# Patient Record
Sex: MALE | Race: WHITE | NOT HISPANIC OR LATINO | URBAN - METROPOLITAN AREA
[De-identification: names, ages, dates, MRNs, and addresses within clinical notes are randomized per-mention and may not be internally consistent; named-entity substitution may affect disease eponyms.]

---
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
## 2023-04-21 ENCOUNTER — OFFICE VISIT (OUTPATIENT)
Dept: URGENT CARE | Facility: CLINIC | Age: 18
End: 2023-04-21
Payer: COMMERCIAL

## 2023-04-21 VITALS
SYSTOLIC BLOOD PRESSURE: 108 MMHG | BODY MASS INDEX: 19.7 KG/M2 | OXYGEN SATURATION: 98 % | HEART RATE: 92 BPM | DIASTOLIC BLOOD PRESSURE: 69 MMHG | TEMPERATURE: 98 F | RESPIRATION RATE: 18 BRPM | WEIGHT: 130 LBS | HEIGHT: 68 IN

## 2023-04-21 DIAGNOSIS — F45.8 BRUXISM: ICD-10-CM

## 2023-04-21 DIAGNOSIS — R19.7 DIARRHEA, UNSPECIFIED TYPE: ICD-10-CM

## 2023-04-21 DIAGNOSIS — K59.09 OTHER CONSTIPATION: ICD-10-CM

## 2023-04-21 DIAGNOSIS — R10.9 ABDOMINAL CRAMPING: ICD-10-CM

## 2023-04-21 DIAGNOSIS — R11.0 NAUSEA: Primary | ICD-10-CM

## 2023-04-21 PROCEDURE — 99214 OFFICE O/P EST MOD 30 MIN: CPT | Mod: S$GLB,,, | Performed by: NURSE PRACTITIONER

## 2023-04-21 PROCEDURE — 99214 PR OFFICE/OUTPT VISIT, EST, LEVL IV, 30-39 MIN: ICD-10-PCS | Mod: S$GLB,,, | Performed by: NURSE PRACTITIONER

## 2023-04-21 RX ORDER — ONDANSETRON 4 MG/1
4 TABLET, ORALLY DISINTEGRATING ORAL EVERY 6 HOURS PRN
Qty: 6 TABLET | Refills: 0 | Status: SHIPPED | OUTPATIENT
Start: 2023-04-21

## 2023-04-21 NOTE — PROGRESS NOTES
"Subjective:      Patient ID: Kevin Sawant is a 17 y.o. male.    Vitals:  height is 5' 7.5" (1.715 m) and weight is 59 kg (130 lb). His tympanic temperature is 97.7 °F (36.5 °C). His blood pressure is 108/69 and his pulse is 92. His respiration is 18 and oxygen saturation is 98%.     Chief Complaint: GI Problem    GI Problem  The primary symptoms include fatigue, abdominal pain and diarrhea. Primary symptoms do not include fever, nausea, vomiting, hematemesis or rash. The illness began yesterday.   The abdominal pain began yesterday. The abdominal pain is generalized. The abdominal pain does not radiate. The severity of the abdominal pain is 7/10. The abdominal pain is relieved by bowel movements and passing flatus.   The illness is also significant for bloating. The illness does not include anorexia, constipation or itching.     Constitution: Positive for fatigue. Negative for fever.   Gastrointestinal:  Positive for abdominal pain and diarrhea. Negative for nausea, vomiting and constipation.   Skin:  Negative for rash.      CHIEF COMPLAINT/REASON FOR VISIT:  Fatigue, abdominal pain, nausea, constipation with diarrhea    HISTORY OF PRESENT ILLNESS:  17-year-old male complains of fatigue, abdominal cramping, constipation with diarrhea, nausea onset yesterday.  Complains of headache at intervals. Patient does admit to having constipation problems at intervals.  Discussed diet and exercise.   Patient denies chest pain, shortness of breath, congestion, fever, cough, dizziness, blurred vision, vomiting,  " aching all over", fatigue, loss of appetite & fever.      Past Medical History:   Diagnosis Date    ADHD (attention deficit hyperactivity disorder)           Social History     Socioeconomic History    Marital status: Single   Tobacco Use    Smoking status: Every Day     Types: Vaping with nicotine    Smokeless tobacco: Never   Substance and Sexual Activity    Alcohol use: Yes     Comment: socially    Drug use: Yes     " Types: Marijuana    Sexual activity: Yes     Partners: Female        History reviewed. No pertinent family history.    ROS:  GENERAL: No fever, chills, fatigability or weight loss.  SKIN: No rashes, itching or changes in color or texture of skin.  HEENT:  headaches, no recent head trauma.  Denies ear pain, discharge or vertigo. No loss of smell, no epistaxis or postnasal drip. No hoarseness or change in voice.   NODES: No masses or lesions. Denies swollen glands.  CHEST: Denies cyanosis, wheezing, cough and sputum production.  CARDIOVASCULAR: Denies chest pain, PND, orthopnea or reduced exercise tolerance.  ABDOMEN:  Nausea, constipation with diarrhea, abdominal cramping.  MUSCULOSKELETAL: No joint stiffness or swelling. Denies back pain.  NEUROLOGIC: No history of seizures, paralysis, alteration of gait or coordination.  PSYCHIATRIC: Denies mood swings, depression or suicidal thoughts.    PE:   APPEARANCE: Well nourished, well developed, in moderate distress.   V/S: Reviewed.  SKIN: Normal skin turgor, no lesions.  HEENT: turbinates pink, mucous membranes okay, buccal mucosa with teeth marks, tenderness on palpation of TMJ region, pink pharynx, TMs clear bilateral.  CHEST:  No respiratory symptoms.  CARDIOVASCULAR: Regular rate and rhythm   ABDOMEN:  tenderness on palpation all 4 quadrants, hyperactive bowel sounds.  MUSCULOSKELETAL: Motor: 5/5 strength major flexors/extensors.  NEUROLOGIC: No sensory deficits. Gait & Posture: Normal gait and fine motion. No cerebellar signs.  MENTAL STATUS: Patient alert, oriented x 3 & conversant.    PLAN:   Administer Toradol 30 mg IM now/deferred  Advise increase p.o. fluids-- water/juice & rest  Advise use OTC laxative with stool softener  Advise use probiotic at HS  Advise warm heat  Advise use of  or retainer  Meds:  Zofran/no refills  Advise clear liquid diet  Advise avoid dairy products  Tylenol or Ibuprofen for fever, headache and body aches.  Advise follow up  with PCP in 2 -3 days for recheck  Advise go to ER if nausea, vomiting, fever, increased pain, or fail to improve with treatment.  AVS provided and reviewed with patient including supportive care, follow up, and red flag symptoms.   Patient verbalizes understanding and agrees with treatment plan. Discharged from Urgent Care in stable condition.      DIAGNOSIS:  Bruxism  Nausea  Diarrhea vs constipation  Abdominal cramping

## 2023-04-21 NOTE — PATIENT INSTRUCTIONS
PLAN:   Administer Toradol 30 mg IM now/deferred  Advise increase p.o. fluids-- water/juice & rest  Advise use OTC laxative with stool softener  Advise use probiotic at HS  Advise warm heat  Advise use of  or retainer  Meds:  Zofran/no refills  Advise clear liquid diet  Advise avoid dairy products  Tylenol or Ibuprofen for fever, headache and body aches.  Advise follow up with PCP in 2 -3 days for recheck  Advise go to ER if nausea, vomiting, fever, increased pain, or fail to improve with treatment.  AVS provided and reviewed with patient including supportive care, follow up, and red flag symptoms.   Patient verbalizes understanding and agrees with treatment plan. Discharged from Urgent Care in stable condition.

## 2024-07-19 ENCOUNTER — TELEPHONE (OUTPATIENT)
Dept: GASTROENTEROLOGY | Facility: CLINIC | Age: 19
End: 2024-07-19
Payer: COMMERCIAL

## 2024-07-19 NOTE — TELEPHONE ENCOUNTER
----- Message from Meka Burdick sent at 7/19/2024  2:52 PM CDT -----  Regarding: Returning missed call  Contact: Pt @[phone]  Pt is returning a missed call from someone in the office and is asking for a return call back soon. Thanks.         Reason for call: returning missed call from Ms. Salinas

## 2024-07-19 NOTE — TELEPHONE ENCOUNTER
----- Message from Jabier Teresa sent at 7/19/2024  1:27 PM CDT -----  Type:  Sooner Apoointment Request    Caller is requesting a sooner appointment.  Caller declined first available appointment listed below.  Caller will not accept being placed on the waitlist and is requesting a message be sent to doctor.  Name of Caller:pt  When is the first available appointment?none  Symptoms:stomach issues, problems w/ gaining weight, no appetite, nausea, vomiting  Would the patient rather a call back or a response via [facility]? call  Best Call Back Number:[phone]  Additional Information: pts mom states she would like a call from office if possible to explain symptoms and try to get an appt asap. Thank you

## 2024-07-19 NOTE — TELEPHONE ENCOUNTER
I return call. The gentleman that answered stated that he never called this number. He said have a good one and disconnected.

## 2024-07-23 ENCOUNTER — TELEPHONE (OUTPATIENT)
Dept: GASTROENTEROLOGY | Facility: CLINIC | Age: 19
End: 2024-07-23
Payer: COMMERCIAL

## 2024-07-23 NOTE — TELEPHONE ENCOUNTER
----- Message from Lola Garcia sent at 7/23/2024 11:08 AM CDT -----  Regarding: Missed call  Contact: Oksana/mother [phone]  Patient's mother Oksana  is returning a missed called from Rita Andrew MA in the office. Please call back as soon as possible.

## 2024-08-07 ENCOUNTER — LAB VISIT (OUTPATIENT)
Dept: LAB | Facility: HOSPITAL | Age: 19
End: 2024-08-07
Payer: COMMERCIAL

## 2024-08-07 ENCOUNTER — OFFICE VISIT (OUTPATIENT)
Dept: GASTROENTEROLOGY | Facility: CLINIC | Age: 19
End: 2024-08-07
Payer: COMMERCIAL

## 2024-08-07 VITALS
BODY MASS INDEX: 20.04 KG/M2 | HEART RATE: 92 BPM | DIASTOLIC BLOOD PRESSURE: 70 MMHG | WEIGHT: 132.25 LBS | SYSTOLIC BLOOD PRESSURE: 116 MMHG | HEIGHT: 68 IN

## 2024-08-07 DIAGNOSIS — R11.2 NAUSEA AND VOMITING, UNSPECIFIED VOMITING TYPE: ICD-10-CM

## 2024-08-07 DIAGNOSIS — F12.188 CANNABIS HYPEREMESIS SYNDROME CONCURRENT WITH AND DUE TO CANNABIS ABUSE: Primary | ICD-10-CM

## 2024-08-07 LAB
BASOPHILS # BLD AUTO: 0.08 K/UL (ref 0–0.2)
BASOPHILS NFR BLD: 1.2 % (ref 0–1.9)
DIFFERENTIAL METHOD BLD: NORMAL
EOSINOPHIL # BLD AUTO: 0.3 K/UL (ref 0–0.5)
EOSINOPHIL NFR BLD: 4.6 % (ref 0–8)
ERYTHROCYTE [DISTWIDTH] IN BLOOD BY AUTOMATED COUNT: 12.6 % (ref 11.5–14.5)
FERRITIN SERPL-MCNC: 116 NG/ML (ref 20–300)
HCT VFR BLD AUTO: 49.4 % (ref 40–54)
HGB BLD-MCNC: 15.9 G/DL (ref 14–18)
IMM GRANULOCYTES # BLD AUTO: 0.01 K/UL (ref 0–0.04)
IMM GRANULOCYTES NFR BLD AUTO: 0.2 % (ref 0–0.5)
IRON SERPL-MCNC: 128 UG/DL (ref 45–160)
LYMPHOCYTES # BLD AUTO: 2.2 K/UL (ref 1–4.8)
LYMPHOCYTES NFR BLD: 34 % (ref 18–48)
MCH RBC QN AUTO: 29.8 PG (ref 27–31)
MCHC RBC AUTO-ENTMCNC: 32.2 G/DL (ref 32–36)
MCV RBC AUTO: 93 FL (ref 82–98)
MONOCYTES # BLD AUTO: 0.3 K/UL (ref 0.3–1)
MONOCYTES NFR BLD: 5.3 % (ref 4–15)
NEUTROPHILS # BLD AUTO: 3.5 K/UL (ref 1.8–7.7)
NEUTROPHILS NFR BLD: 54.7 % (ref 38–73)
NRBC BLD-RTO: 0 /100 WBC
PLATELET # BLD AUTO: 228 K/UL (ref 150–450)
PMV BLD AUTO: 10 FL (ref 9.2–12.9)
RBC # BLD AUTO: 5.34 M/UL (ref 4.6–6.2)
SATURATED IRON: 36 % (ref 20–50)
TOTAL IRON BINDING CAPACITY: 351 UG/DL (ref 250–450)
TRANSFERRIN SERPL-MCNC: 237 MG/DL (ref 200–375)
WBC # BLD AUTO: 6.47 K/UL (ref 3.9–12.7)

## 2024-08-07 PROCEDURE — 83540 ASSAY OF IRON: CPT | Performed by: STUDENT IN AN ORGANIZED HEALTH CARE EDUCATION/TRAINING PROGRAM

## 2024-08-07 PROCEDURE — 99999 PR PBB SHADOW E&M-EST. PATIENT-LVL III: CPT | Mod: PBBFAC,,, | Performed by: STUDENT IN AN ORGANIZED HEALTH CARE EDUCATION/TRAINING PROGRAM

## 2024-08-07 PROCEDURE — 82728 ASSAY OF FERRITIN: CPT | Performed by: STUDENT IN AN ORGANIZED HEALTH CARE EDUCATION/TRAINING PROGRAM

## 2024-08-07 PROCEDURE — 85025 COMPLETE CBC W/AUTO DIFF WBC: CPT | Performed by: STUDENT IN AN ORGANIZED HEALTH CARE EDUCATION/TRAINING PROGRAM

## 2024-08-07 PROCEDURE — 36415 COLL VENOUS BLD VENIPUNCTURE: CPT | Performed by: STUDENT IN AN ORGANIZED HEALTH CARE EDUCATION/TRAINING PROGRAM

## 2024-08-07 PROCEDURE — 86677 HELICOBACTER PYLORI ANTIBODY: CPT | Performed by: STUDENT IN AN ORGANIZED HEALTH CARE EDUCATION/TRAINING PROGRAM

## 2024-08-08 LAB — H PYLORI IGG SERPL QL IA: NEGATIVE

## 2024-09-05 ENCOUNTER — HOSPITAL ENCOUNTER (OUTPATIENT)
Dept: RADIOLOGY | Facility: HOSPITAL | Age: 19
Discharge: HOME | End: 2024-09-05
Attending: ORTHOPAEDIC SURGERY
Payer: COMMERCIAL

## 2024-09-05 ENCOUNTER — OFFICE VISIT (OUTPATIENT)
Dept: SPORTS MEDICINE | Facility: CLINIC | Age: 19
End: 2024-09-05
Payer: COMMERCIAL

## 2024-09-05 VITALS — HEIGHT: 67 IN | WEIGHT: 132.25 LBS | BODY MASS INDEX: 20.76 KG/M2

## 2024-09-05 DIAGNOSIS — S99.921A INJURY OF RIGHT FOOT, INITIAL ENCOUNTER: Primary | ICD-10-CM

## 2024-09-05 DIAGNOSIS — S93.601A SPRAIN OF RIGHT FOOT, INITIAL ENCOUNTER: ICD-10-CM

## 2024-09-05 DIAGNOSIS — M79.672 LEFT FOOT PAIN: ICD-10-CM

## 2024-09-05 PROCEDURE — 1159F MED LIST DOCD IN RCRD: CPT | Mod: CPTII,S$GLB,, | Performed by: ORTHOPAEDIC SURGERY

## 2024-09-05 PROCEDURE — 73630 X-RAY EXAM OF FOOT: CPT | Mod: TC,PN,RT

## 2024-09-05 PROCEDURE — 99204 OFFICE O/P NEW MOD 45 MIN: CPT | Mod: 25,S$GLB,, | Performed by: ORTHOPAEDIC SURGERY

## 2024-09-05 PROCEDURE — 3008F BODY MASS INDEX DOCD: CPT | Mod: CPTII,S$GLB,, | Performed by: ORTHOPAEDIC SURGERY

## 2024-09-05 PROCEDURE — 97760 ORTHOTIC MGMT&TRAING 1ST ENC: CPT | Mod: GP,S$GLB,, | Performed by: ORTHOPAEDIC SURGERY

## 2024-09-05 PROCEDURE — 99999 PR PBB SHADOW E&M-EST. PATIENT-LVL IV: CPT | Mod: PBBFAC,,, | Performed by: ORTHOPAEDIC SURGERY

## 2024-09-05 PROCEDURE — 73630 X-RAY EXAM OF FOOT: CPT | Mod: 26,RT,, | Performed by: RADIOLOGY

## 2024-09-06 ENCOUNTER — HOSPITAL ENCOUNTER (OUTPATIENT)
Dept: RADIOLOGY | Facility: HOSPITAL | Age: 19
Discharge: HOME | End: 2024-09-06
Attending: ORTHOPAEDIC SURGERY
Payer: COMMERCIAL

## 2024-09-06 DIAGNOSIS — S99.921A INJURY OF RIGHT FOOT, INITIAL ENCOUNTER: ICD-10-CM

## 2024-09-06 PROCEDURE — 73718 MRI LOWER EXTREMITY W/O DYE: CPT | Mod: TC,PN,RT

## 2024-09-06 PROCEDURE — 73718 MRI LOWER EXTREMITY W/O DYE: CPT | Mod: 26,RT,, | Performed by: RADIOLOGY

## 2024-09-07 NOTE — PATIENT INSTRUCTIONS
Assessment:  Kevin Sawant is a  19 y.o. male [facility] [place] [employer] with a chief complaint of Injury of the Right Foot    Right midfoot injury on 8/27  Concern for midfoot sprain     Encounter Diagnoses   Name Primary?    Injury of right foot, initial encounter Yes    Sprain of right foot, initial encounter         Plan:  Switch to short walking boot to wear for protected ambulation  10 minutes were spent sizing, fitting, and educating regarding durable medical equipment by Dr. Wilfred Wade and his assistant under his direction today.  CPT 10680.  MRI of Right foot  Left foot WB xrays to rule out lisfranc injury     Follow-up: AFTER IMAGING or sooner if there are any problems between now and then.    Leave Review:   Google: Leave Google Review  Healthgrades: Leave Healthgrades Review    After Hours Number: [phone]

## 2024-09-07 NOTE — PROGRESS NOTES
Patient ID: Kevin Sawant  YOB: 2005  MRN: [id number]    Chief Complaint: Injury of the Right Foot    Referred By: [facility]     History of Present Illness: Kevin Sawant is a  19 y.o. male [facility] [employer] with a chief complaint of Injury of the Right Foot    Kevin presents today for evaluation of his right foot. He reports an injury during soccer on 8/27/24. He reports kicking another persons foot very hard on the top of his foot. He was evalauted at the [facility]. He was given a cast shoe for treatment. He reports continued dorsal foot pain. He reports swelling across the top of his foot where he hit the other person. He reports pain with plantarflexion of his foot and flexion of his toes.      Injury      Past Medical History:   Past Medical History:   Diagnosis Date    ADHD (attention deficit hyperactivity disorder)      History reviewed. No pertinent surgical history.  Family History   Problem Relation Name Age of Onset    Colon cancer Neg Hx      Esophageal cancer Neg Hx       Social History     Socioeconomic History    Marital status: Single   Tobacco Use    Smoking status: Every Day    Smokeless tobacco: Never    Tobacco comments:     ZYN 3mg nicotine pouches    Substance and Sexual Activity    Alcohol use: Yes     Comment: socially    Drug use: Yes     Types: Marijuana    Sexual activity: Yes     Partners: Female   Social History Narrative    N/A PER THE PATIENT.     Medication List with Changes/Refills   Current Medications    ONDANSETRON (ZOFRAN-ODT) 4 MG TBDL    Take 1 tablet (4 mg total) by mouth every 6 (six) hours as needed (P.r.n.).    UNABLE TO FIND    AG1 POWER     Review of patient's allergies indicates:   Allergen Reactions    Milk containing products (dairy)      ROS    Physical Exam:   Body mass index is 20.72 kg/m².  There were no vitals filed for this visit.   GENERAL: Well appearing, appropriate for stated age, no  acute distress.  CARDIOVASCULAR: Pulses regular by peripheral palpation.  PULMONARY: Respirations are even and non-labored.  NEURO: Awake, alert, and oriented x 3.  PSYCH: Mood & affect are appropriate.  HEENT: Head is normocephalic and atraumatic.          Right Ankle/Foot Exam     Inspection   Bruising:  Foot - absent  Effusion:  Foot - present    Swelling   The patient is swollen on the metatarsals.    Tenderness   The patient is tender to palpation of the metatarsals.    Pain   The patient exhibits pain of the metatarsals.    Range of Motion   The patient has normal right ankle ROM.  Ankle Joint   Right ankle plantar flexion: pain with plantarflexion across midfoot.     Tests   Anterior drawer: negative  Varus tilt: negative    Other   Sensation: normal    Comments:  Intact EHL, FHL, gastrocsoleus, and tibialis anterior. Sensation intact to light touch in superficial peroneal, deep peroneal, tibial, sural, and saphenous nerve distributions. Foot warm and well perfused with capillary refill of less than 2 seconds and palpable pedal pulses.          Vascular Exam     Right Pulses  Dorsalis Pedis:      2+  Posterior Tibial:      2+          Imaging:    X-Ray Foot Complete 3 view Right  Narrative: EXAMINATION:  XR FOOT COMPLETE 3 VIEW RIGHT    CLINICAL HISTORY:  . Pain in left foot    TECHNIQUE:  AP, lateral, and oblique views of the foot were performed.    COMPARISON:  None    FINDINGS:  There is no evidence to suggest acute fracture or dislocation.  The joint spaces appear to be well maintained.  Impression: As above    Electronically signed by: Neil Ibrahim DO  Date:    09/05/2024  Time:    13:27      Relevant imaging results reviewed and interpreted by me, discussed with the patient and / or family today. Agree with above findings    Other Tests:         Patient Instructions   Assessment:  Kevin Sawant is a  19 y.o. male [facility] [place] [employer] with a chief complaint of Injury of the  Right Foot    Right midfoot injury on 8/27  Concern for midfoot sprain     Encounter Diagnoses   Name Primary?    Injury of right foot, initial encounter Yes    Sprain of right foot, initial encounter         Plan:  Switch to short walking boot to wear for protected ambulation  10 minutes were spent sizing, fitting, and educating regarding durable medical equipment by Dr. Wilfred Wade and his assistant under his direction today.  CPT 42118.  MRI of Right foot  Left foot WB xrays to rule out lisfranc injury     Follow-up: AFTER IMAGING or sooner if there are any problems between now and then.    Leave Review:   Google: Leave Google Review  Healthgrades: Leave Healthgrades Review    After Hours Number: [phone]       Provider Note/Medical Decision Making:       I discussed worrisome and red flag signs and symptoms with the patient. The patient expressed understanding and agreed to alert me immediately or to go to the emergency room if they experience any of these.   Treatment plan was developed with input from the patient/family, and they expressed understanding and agreement with the plan. All questions were answered today.          Wilfred Wade MD  Orthopaedic Surgery & Sports Medicine       Disclaimer: This note was prepared using a voice recognition system and is likely to have sound alike errors within the text.     I, Elly Wong, acted as a scribe for Wilfred Wade MD for the duration of this office visit.

## 2024-09-12 ENCOUNTER — OFFICE VISIT (OUTPATIENT)
Dept: SPORTS MEDICINE | Facility: CLINIC | Age: 19
End: 2024-09-12
Payer: COMMERCIAL

## 2024-09-12 ENCOUNTER — HOSPITAL ENCOUNTER (OUTPATIENT)
Dept: RADIOLOGY | Facility: HOSPITAL | Age: 19
Discharge: HOME | End: 2024-09-12
Attending: ORTHOPAEDIC SURGERY
Payer: COMMERCIAL

## 2024-09-12 DIAGNOSIS — T14.8XXA BONE BRUISE: Primary | ICD-10-CM

## 2024-09-12 DIAGNOSIS — S90.31XD CONTUSION OF RIGHT FOOT, SUBSEQUENT ENCOUNTER: ICD-10-CM

## 2024-09-12 DIAGNOSIS — M79.672 BILATERAL FOOT PAIN: ICD-10-CM

## 2024-09-12 DIAGNOSIS — M79.671 BILATERAL FOOT PAIN: ICD-10-CM

## 2024-09-12 PROCEDURE — 1159F MED LIST DOCD IN RCRD: CPT | Mod: CPTII,S$GLB,, | Performed by: ORTHOPAEDIC SURGERY

## 2024-09-12 PROCEDURE — 73620 X-RAY EXAM OF FOOT: CPT | Mod: 26,50,, | Performed by: RADIOLOGY

## 2024-09-12 PROCEDURE — 99999 PR PBB SHADOW E&M-EST. PATIENT-LVL III: CPT | Mod: PBBFAC,,, | Performed by: ORTHOPAEDIC SURGERY

## 2024-09-12 PROCEDURE — 99214 OFFICE O/P EST MOD 30 MIN: CPT | Mod: S$GLB,,, | Performed by: ORTHOPAEDIC SURGERY

## 2024-09-12 PROCEDURE — 73620 X-RAY EXAM OF FOOT: CPT | Mod: TC,50,PN

## 2024-09-12 NOTE — PROGRESS NOTES
Patient ID: Kevin Sawant  YOB: 2005  MRN: [id number]    Chief Complaint: Pain of the Right Foot      Referred By: [facility]    History of Present Illness: Kevin Sawant is a  19 y.o. male [facility] [place] with a chief complaint of Pain of the Right Foot    Kevin presents today for follow up of his right foot. He reports minor improvement of his right foot from the walking boot. He presents today to review a right foot MRI.     Recall from 9/5/24: Kevin presents today for evaluation of his right foot. He reports an injury during soccer on 8/27/24. He reports kicking another persons foot very hard on the top of his foot. He was evalauted at the [facility]. He was given a cast shoe for treatment. He reports continued dorsal foot pain. He reports swelling across the top of his foot where he hit the other person. He reports pain with plantarflexion of his foot and flexion of his toes.          HPI    Past Medical History:   Past Medical History:   Diagnosis Date    ADHD (attention deficit hyperactivity disorder)      History reviewed. No pertinent surgical history.  Family History   Problem Relation Name Age of Onset    Colon cancer Neg Hx      Esophageal cancer Neg Hx       Social History     Socioeconomic History    Marital status: Single   Tobacco Use    Smoking status: Every Day    Smokeless tobacco: Never    Tobacco comments:     ZYN 3mg nicotine pouches    Substance and Sexual Activity    Alcohol use: Yes     Comment: socially    Drug use: Yes     Types: Marijuana    Sexual activity: Yes     Partners: Female   Social History Narrative    N/A PER THE PATIENT.     Medication List with Changes/Refills   Current Medications    ONDANSETRON (ZOFRAN-ODT) 4 MG TBDL    Take 1 tablet (4 mg total) by mouth every 6 (six) hours as needed (P.r.n.).    UNABLE TO FIND    AG1 POWER     Review of patient's allergies indicates:   Allergen Reactions    Milk  containing products (dairy)      ROS    Physical Exam:   There is no height or weight on file to calculate BMI.  There were no vitals filed for this visit.   GENERAL: Well appearing, appropriate for stated age, no acute distress.  CARDIOVASCULAR: Pulses regular by peripheral palpation.  PULMONARY: Respirations are even and non-labored.  NEURO: Awake, alert, and oriented x 3.  PSYCH: Mood & affect are appropriate.  HEENT: Head is normocephalic and atraumatic.          Right Ankle/Foot Exam     Other   Sensation: normal    Comments:  Alignment neutral  Able to perform heel raise   Swelling around 3rd and 4th metatarsal bases and midfoot  Pain across dorsal midfoot   Intact EHL, FHL, gastrocsoleus, and tibialis anterior. Sensation intact to light touch in superficial peroneal, deep peroneal, tibial, sural, and saphenous nerve distributions. Foot warm and well perfused with capillary refill of less than 2 seconds and palpable pedal pulses.          Vascular Exam     Right Pulses  Dorsalis Pedis:      2+  Posterior Tibial:      2+          Imaging:    X-Ray Foot 2 View Bilateral  Narrative: EXAM:  XR FOOT 2 VIEW BILATERAL    INDICATIONS:  Bilateral foot pain    TECHNIQUE:  Frontal view of both feet with a lateral left (lateral right was on study one week ago)    COMPARISONS:  Right foot x-ray and MR one week ago    FINDINGS:  Comparison views of this study with the previous right foot examination is normal.  I see no fractures, alignment abnormalities, foreign bodies nor degenerative changes.  No joint effusions and no congenital abnormalities.  Impression:   Normal bilateral feet as described (contusions seen on MR are not visible on x-ray)    Finalized on: 9/12/2024 8:53 AM By:  Odell López MD  BRRG# [id number]      2024-09-12 08:[id number]    BRSTEFANIEG      Relevant imaging results reviewed and interpreted by me, discussed with the patient and / or family today.  He had MRI with patient which shows bone contusions.  Morales  ligament intact.    Other Tests:       Patient Instructions   Assessment:  Kevin Sawant is a  19 y.o. male [facility] [place] [employer] with a chief complaint of Pain of the Right Foot    Right midfoot injury on 8/27  Midfoot bone marrow contusion pattern, most pronounced involving the 3rd and fourth metatarsal bases     Encounter Diagnoses   Name Primary?    Bone bruise Yes    Contusion of right foot, subsequent encounter         Plan:  Continue walking boot until walking with no pain. Transition to hard sole shoe and then transition to regular shoe when no pain with walking with hard sole shoe.   Advised to let pain guide return to activity  Order PT at [place] with Manuel - 2-3x per week for 6-8 weeks    Follow-up: 3 weeks (Virtual visit)  or sooner if there are any problems between now and then.    Leave Review:   Google: Leave Google Review  Healthgrades: Leave Healthgrades Review    After Hours Number: [phone]     Provider Note/Medical Decision Making:  Discussed surgical and nonsurgical options.  Recommend nonsurgical treatment considering the Lisfranc ligament is intact and there is no definite fracture line.      I discussed worrisome and red flag signs and symptoms with the patient. The patient expressed understanding and agreed to alert me immediately or to go to the emergency room if they experience any of these.   Treatment plan was developed with input from the patient/family, and they expressed understanding and agreement with the plan. All questions were answered today.          Wilfred Wade MD  Orthopaedic Surgery & Sports Medicine       Disclaimer: This note was prepared using a voice recognition system and is likely to have sound alike errors within the text.     I, Elly Wong, acted as a scribe for Wilfred Wade MD for the duration of this office visit.

## 2024-09-12 NOTE — PATIENT INSTRUCTIONS
Assessment:  Kevin Sawant is a  19 y.o. male [facility] [place] [employer] with a chief complaint of Pain of the Right Foot    Right midfoot injury on 8/27  Midfoot bone marrow contusion pattern, most pronounced involving the 3rd and fourth metatarsal bases     Encounter Diagnoses   Name Primary?    Bone bruise Yes    Contusion of right foot, subsequent encounter         Plan:  Continue walking boot until walking with no pain. Transition to hard sole shoe and then transition to regular shoe when no pain with walking with hard sole shoe.   Advised to let pain guide return to activity  Order PT at [place] with Manuel - 2-3x per week for 6-8 weeks    Follow-up: 3 weeks (Virtual visit)  or sooner if there are any problems between now and then.    Leave Review:   Google: Leave Google Review  Healthgrades: Leave Healthgrades Review    After Hours Number: [phone]

## 2024-09-16 ENCOUNTER — CLINICAL SUPPORT (OUTPATIENT)
Facility: HOSPITAL | Age: 19
End: 2024-09-16
Payer: COMMERCIAL

## 2024-09-16 DIAGNOSIS — T14.8XXA BONE BRUISE: ICD-10-CM

## 2024-09-16 DIAGNOSIS — R29.898 DECREASED STRENGTH OF LOWER EXTREMITY: ICD-10-CM

## 2024-09-16 DIAGNOSIS — S90.31XD CONTUSION OF RIGHT FOOT, SUBSEQUENT ENCOUNTER: ICD-10-CM

## 2024-09-16 DIAGNOSIS — R52 PAIN AGGRAVATED BY ACTIVITIES OF DAILY LIVING: Primary | ICD-10-CM

## 2024-09-16 PROCEDURE — 97110 THERAPEUTIC EXERCISES: CPT | Mod: PN | Performed by: PHYSICAL THERAPIST

## 2024-09-16 PROCEDURE — 97161 PT EVAL LOW COMPLEX 20 MIN: CPT | Mod: PN | Performed by: PHYSICAL THERAPIST

## 2024-09-18 ENCOUNTER — CLINICAL SUPPORT (OUTPATIENT)
Facility: HOSPITAL | Age: 19
End: 2024-09-18
Payer: COMMERCIAL

## 2024-09-18 DIAGNOSIS — R29.898 DECREASED STRENGTH OF LOWER EXTREMITY: ICD-10-CM

## 2024-09-18 DIAGNOSIS — R52 PAIN AGGRAVATED BY ACTIVITIES OF DAILY LIVING: Primary | ICD-10-CM

## 2024-09-18 PROCEDURE — 97112 NEUROMUSCULAR REEDUCATION: CPT | Mod: PN | Performed by: PHYSICAL THERAPIST

## 2024-09-18 PROCEDURE — 97110 THERAPEUTIC EXERCISES: CPT | Mod: PN | Performed by: PHYSICAL THERAPIST

## 2024-09-18 PROCEDURE — 97140 MANUAL THERAPY 1/> REGIONS: CPT | Mod: PN | Performed by: PHYSICAL THERAPIST

## 2024-09-18 PROCEDURE — 97530 THERAPEUTIC ACTIVITIES: CPT | Mod: PN | Performed by: PHYSICAL THERAPIST

## 2024-09-18 NOTE — PROGRESS NOTES
"[facility]   Physical Therapy Treatment Note      Name: Kevin Sawant  Clinic Number: [id number]    Therapy Diagnosis:   Encounter Diagnoses   Name Primary?    Pain aggravated by activities of daily living Yes    Decreased strength of lower extremity      Physician: Wilfred Wade MD     Physician Orders: PT Eval and Treat   Medical Diagnosis from Referral: Bone bruise [T14.8XXA], Contusion of right foot, subsequent encounter [S90.31XD]   Evaluation Date: 9/16/2024  Authorization Period Expiration: 9/12/25  Plan of Care Expiration: 12/16/24  Progress Note Due: 12/12/24  Visit # / Visits authorized: 1/ 20     Time In: 1010  Time Out: 1110  Total Billable Time: 53 minutes Billing reflects 1:1 direct supervision    MD follow-up:    Precautions: Standard    FOTO:  Goal: 87%  -Intake: 63%  -Status: incomplete  -Discharge: incomplete    Subjective     Pt reports: 9/18- Feeling better today, he started walking with normal shoes.  He was compliant with home exercise program.  Response to previous treatment: Improving symptoms  Functional change: Gradually improving function    Pain: Minimal/10  Location: right foot     Objective      Objective Measures updated at progress report unless specified otherwise.    Problem List:  Impaired ankle/foot mobility  Impaired calf strength  Impaired tolerance to functional loads    Treatment     Kevin received the treatments listed below:      CPT Code Intervention Date/Notes  9/18   MT Manual Therapy Ankle distraction, ankle AP mobs  Ankle PA mobs   TE SL heel raise 3 x 15   NR SLS around the world 4 x 30s   TE Goblet squat 25# 3 x 12   TE Seated heel raise on knee extension machine 105# NP   TA Alter G 50% running 2 min on/off x 20 minutes   NR Lateral step down 6" 3 x 15     15 minutes of Therapeutic Exercise (TE) to develop strength, endurance, ROM, and flexibility. (21599)  08 minutes of Manual therapy (MT) to improve pain and ROM. (59551)  10 minutes of " Neuromuscular Re-Education (NMR)  to improve: Balance, Coordination, Kinesthetic, Sense, Proprioception, and Posture. (62799)  20 minutes of Therapeutic Activities (TA) to improve functional performance. (14681)  Unattended Electrical Stimulation (ES) for muscle performance and/or pain modulation. (34107)  Vasopneumatic Device Therapy () for management of swelling/edema. (41126)  BFR: Blood flow restriction applied during exercise  NP: Not Performed        Patient Education and Home Exercises         Education provided:   Physical therapy diagnosis, prognosis, and plan of care.     Written Home Exercises Provided: Patient instructed to cont prior HEP.  Exercises were reviewed and Kevin was able to demonstrate them prior to the end of the session.  Kevin demonstrated good  understanding of the education provided.     See EMR under Patient Instructions for exercises provided prior visit.    Assessment     9/18- The patient shows improved symptoms today. He tolerated introduction to more functional loads including gravity assisted running without significant issues.    Kevin Is progressing well towards his goals.   Pt prognosis is Excellent.     Pt will continue to benefit from skilled outpatient physical therapy to address the deficits listed in the problem list box on initial evaluation, provide pt/family education and to maximize pt's level of independence in the home and community environment.     Pt's spiritual, cultural and educational needs considered and pt agreeable to plan of care and goals.    Anticipated barriers to physical therapy: None     Goals:     Short Term Goals:  6 weeks Status  Date Met   PAIN: Pt will report worst pain of 2/10 in order to progress toward max functional ability and improve quality of life. [x] Progressing  [] Met  [] Not Met     FUNCTION: Patient will demonstrate improved function as indicated by a functional score improvement of at least 5 points on FOTO. [x] Progressing  []  Met  [] Not Met     MOBILITY: Patient will improve AROM to 50% of stated goals, listed in objective measures above, in order to progress towards independence with functional activities.  [x] Progressing  [] Met  [] Not Met     STRENGTH: Patient will improve strength to 50% of stated goals, listed in objective measures above, in order to progress towards independence with functional activities. [x] Progressing  [] Met  [] Not Met     POSTURE: Patient will correct postural deviations in sitting and standing, to decrease pain and promote long term stability.  [x] Progressing  [] Met  [] Not Met     GAIT: Patient will demonstrate improved gait mechanics including symmetrical gait pattern in order to improve functional mobility, improve quality of life, and decrease risk of further injury or fall.  [x] Progressing  [] Met  [] Not Met     HEP: Patient will demonstrate independence with HEP in order to progress toward functional independence. [x] Progressing  [] Met  [] Not Met        Long Term Goals:  12 weeks Status Date Met   PAIN: Pt will report worst pain of 0/10 in order to progress toward max functional ability and improve quality of life [x] Progressing  [] Met  [] Not Met     FUNCTION: Patient will demonstrate improved function as indicated by a FOTO functional score improvement as listed in header. [x] Progressing  [] Met  [] Not Met     MOBILITY: Patient will improve AROM to stated goals, listed in objective measures above, in order to return to maximal functional potential and improve quality of life.  [x] Progressing  [] Met  [] Not Met     STRENGTH: Patient will improve strength to stated goals, listed in objective measures above, in order to improve functional independence and quality of life.  [x] Progressing  [] Met  [] Not Met     GAIT: Patient will demonstrate normalized gait mechanics with minimal compensation in order to return to PLOF. [x] Progressing  [] Met  [] Not Met     Patient will return to  normal ADL's, IADL's, community involvement, recreational activities, and work-related activities with less than or equal to 0/10 pain and maximal function.  [x] Progressing  [] Met  [] Not Met          Plan     Plan of care Certification: 9/16/2024 to 12/16/24.      Continue to progress per protocol and per patient tolerance      Manuel Morgan, PT

## 2024-09-19 NOTE — PLAN OF CARE
[facility]   Physical Therapy Initial Evaluation      Name: Kevin Sawant  Clinic Number: [id number]    Therapy Diagnosis:   Encounter Diagnoses   Name Primary?    Bone bruise     Contusion of right foot, subsequent encounter     Pain aggravated by activities of daily living Yes    Decreased strength of lower extremity         Physician: Wilfred Wade MD    Physician Orders: PT Eval and Treat   Medical Diagnosis from Referral: Bone bruise [T14.8XXA], Contusion of right foot, subsequent encounter [S90.31XD]   Evaluation Date: 9/16/2024  Authorization Period Expiration: 9/12/25  Plan of Care Expiration: 12/16/24  Progress Note Due: 12/12/24  Visit # / Visits authorized: 1/ 1     FOTO:  Goal: 87%  -Intake: 63%  -Status: incomplete  -Discharge: incomplete    Precautions: Standard     Time In: 1010  Time Out: 1105  Total Appointment Time (timed & untimed codes): 55 minutes    Subjective     Date of onset: Around 2 weeks ago    History of current condition - Kevin reports: Took a blow to the top of his foot when trying to kick a ball during club soccer tryouts. He can walk around fine, he was in a boot for awhile but feels okay with shoe split. If he were to try to run he thinks he would have pain.    He had a bone contusion on the same foot years ago.    Falls:     Imaging: [] Xray [x] MRI [] CT: Performed on:   Impression:   Multifocal midfoot bone marrow contusion pattern, most pronounced involving the third and fourth metatarsal bases.  Negative for fracture.    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    Medications:   Kevin has a current medication list which includes the following prescription(s): ondansetron and UNABLE TO FIND.    Allergies:   Review of patient's allergies indicates:   Allergen Reactions    Milk containing products (dairy)         Objective      Observation:   POSTURE:    GAIT:   Mildly antalgic gait      FUNCTIONAL MOVEMENT PATTERNS  Movement Analysis Notes   Squat []Functional  [x]Dysfunctional:  []Painful  [x]Non-Painful    Decreased R anterior tibial translation         RANGE OF MOTION:     Ankle/Foot AROM/PROM Right Left Pain/Dysfunction with Movement Goal   Dorsiflexion (20º OKC, 4" CKC) 10 20  4" CKC   Plantarflexion (50º) 50 50 Pain    Inversion (35º) 40 40     Eversion (15º) 15 15            STRENGTH:   L/E MMT Right  (spine) Left Pain/Dysfunction with Movement Goal   Ankle DF 4/5 5/5  5/5 B   Ankle PF 4/5 5/5  5/5 B   Ankle Inversion 4/5 5/5  5/5 B   Ankle Eversion 4/5 5/5  5/5 B          Joint mobility:  Impaired midfoot mobility  Impaired TC mobility  =      SENSATION  [x] Intact to Light Touch   [] Impaired:      PALPATION: Structures: Increased tenderness to palpation of: right midfoot        Function:     Intake Outcome Measure for FOTO foot Survey    Therapist reviewed FOTO scores for Kevin Sawant on 9/16/2024.   FOTO documents entered into [employer] [facility] - see Media section.    Intake Score: 63%         Treatment     Total Treatment time (time-based codes) separate from Evaluation: (13) minutes     Kevin received the treatments listed below:      CPT Code Intervention Date/Notes  9/16    MT Manual Therapy      TE heel raise 3 x 15    TE SLS around the world 3 X 30S    TE Air squat 3 x 12     08 minutes of Therapeutic Exercise (TE) to develop strength, endurance, ROM, and flexibility. (76478)  05 minutes of " Manual therapy (MT) to improve pain and ROM. (59020)  00 minutes of Neuromuscular Re-Education (NMR)  to improve: Balance, Coordination, Kinesthetic, Sense, Proprioception, and Posture. (72728)  00 minutes of Therapeutic Activities (TA) to improve functional performance. (84834)  Unattended Electrical Stimulation (ES) for muscle performance and/or pain modulation. (19081)  Vasopneumatic Device Therapy () for management of swelling/edema. (19752)  BFR: Blood flow restriction applied during exercise  NP: Not Performed    Patient Education and Home Exercises     Education provided: (10) minutes  PURPOSE: Patient educated on the impairments noted above and the effects of physical therapy intervention to improve overall condition and QOL.   Physical therapy diagnosis, prognosis, and plan of care.   Activity Modifications:     Written Home Exercises Provided: yes.  Exercises were reviewed and Kevin was able to demonstrate them prior to the end of the session.  Kevin demonstrated good  understanding of the education provided. See EMR under Patient Instructions for exercises provided during therapy sessions.    Assessment     Kevin is a 19 y.o. male referred to outpatient Physical Therapy with a medical diagnosis of Bone bruise [T14.8XXA], Contusion of right foot, subsequent encounter [S90.31XD] . Clinical exam is consistent with referring diagnosis.     Problem List:  Impaired ankle/foot mobility  Impaired calf strength  Impaired tolerance to functional loads    Pt prognosis is Excellent  Pt will benefit from skilled outpatient Physical Therapy to address the deficits stated above and in the chart below, provide pt/family education, and to maximize pt's level of independence.     Plan of care discussed with patient: Yes  Pt's spiritual, cultural and educational needs considered and patient is agreeable to the plan of care and goals as stated below:     Anticipated Barriers for therapy: none    Medical Necessity is  demonstrated by the following   History  Co-morbidities and personal factors that may impact the plan of care [x] LOW: no personal factors / co-morbidities  [] MODERATE: 1-2 personal factors / co-morbidities  [] HIGH: 3+ personal factors / co-morbidities    Moderate / High Support Documentation: See patient medical history and objective assessment     Examination  Body Structures and Functions, activity limitations and participation restrictions that may impact the plan of care [x] LOW: addressing 1-2 elements  [] MODERATE: 3+ elements  [] HIGH: 4+ elements (please support below)    Moderate / High Support Documentation: See patient medical history and objective assessment     Clinical Presentation [x] LOW: stable  [] MODERATE: Evolving  [] HIGH: Unstable     Decision Making/ Complexity Score: low         Short Term Goals:  6 weeks Status  Date Met   PAIN: Pt will report worst pain of 2/10 in order to progress toward max functional ability and improve quality of life. [x] Progressing  [] Met  [] Not Met    FUNCTION: Patient will demonstrate improved function as indicated by a functional score improvement of at least 5 points on FOTO. [x] Progressing  [] Met  [] Not Met    MOBILITY: Patient will improve AROM to 50% of stated goals, listed in objective measures above, in order to progress towards independence with functional activities.  [x] Progressing  [] Met  [] Not Met    STRENGTH: Patient will improve strength to 50% of stated goals, listed in objective measures above, in order to progress towards independence with functional activities. [x] Progressing  [] Met  [] Not Met    POSTURE: Patient will correct postural deviations in sitting and standing, to decrease pain and promote long term stability.  [x] Progressing  [] Met  [] Not Met    GAIT: Patient will demonstrate improved gait mechanics including symmetrical gait pattern in order to improve functional mobility, improve quality of life, and decrease risk of  further injury or fall.  [x] Progressing  [] Met  [] Not Met    HEP: Patient will demonstrate independence with HEP in order to progress toward functional independence. [x] Progressing  [] Met  [] Not Met      Long Term Goals:  12 weeks Status Date Met   PAIN: Pt will report worst pain of 0/10 in order to progress toward max functional ability and improve quality of life [x] Progressing  [] Met  [] Not Met    FUNCTION: Patient will demonstrate improved function as indicated by a FOTO functional score improvement as listed in header. [x] Progressing  [] Met  [] Not Met    MOBILITY: Patient will improve AROM to stated goals, listed in objective measures above, in order to return to maximal functional potential and improve quality of life.  [x] Progressing  [] Met  [] Not Met    STRENGTH: Patient will improve strength to stated goals, listed in objective measures above, in order to improve functional independence and quality of life.  [x] Progressing  [] Met  [] Not Met    GAIT: Patient will demonstrate normalized gait mechanics with minimal compensation in order to return to PLOF. [x] Progressing  [] Met  [] Not Met    Patient will return to normal ADL's, IADL's, community involvement, recreational activities, and work-related activities with less than or equal to 0/10 pain and maximal function.  [x] Progressing  [] Met  [] Not Met      Plan   Plan of care Certification: 9/16/2024 to 12/16/24.    Outpatient Physical Therapy 2 times weekly for 12 weeks to include any combination of the following interventions: virtual visits, dry needling, modalities, electrical stimulation (IFC, Pre-Mod, Attended with Functional Dry Needling), Cervical/Lumbar Traction, Gait Training, Manual Therapy, Neuromuscular Re-ed, Patient Education, Self Care, Therapeutic Exercise, and Therapeutic Activites     Manuel Morgan, PT, DPT      I CERTIFY THE NEED FOR THESE SERVICES FURNISHED UNDER THIS PLAN OF TREATMENT AND WHILE UNDER MY CARE    Physician's comments:     Physician's Signature: ___________________________________________________

## 2024-09-25 ENCOUNTER — CLINICAL SUPPORT (OUTPATIENT)
Facility: HOSPITAL | Age: 19
End: 2024-09-25
Payer: COMMERCIAL

## 2024-09-25 DIAGNOSIS — R52 PAIN AGGRAVATED BY ACTIVITIES OF DAILY LIVING: Primary | ICD-10-CM

## 2024-09-25 DIAGNOSIS — R29.898 DECREASED STRENGTH OF LOWER EXTREMITY: ICD-10-CM

## 2024-09-25 PROCEDURE — 97530 THERAPEUTIC ACTIVITIES: CPT | Mod: PN | Performed by: PHYSICAL THERAPIST

## 2024-09-25 PROCEDURE — 97112 NEUROMUSCULAR REEDUCATION: CPT | Mod: PN | Performed by: PHYSICAL THERAPIST

## 2024-09-25 PROCEDURE — 97110 THERAPEUTIC EXERCISES: CPT | Mod: PN | Performed by: PHYSICAL THERAPIST

## 2024-09-25 PROCEDURE — 97140 MANUAL THERAPY 1/> REGIONS: CPT | Mod: PN | Performed by: PHYSICAL THERAPIST

## 2024-09-25 NOTE — PROGRESS NOTES
[facility]   Physical Therapy Treatment Note      Name: Kevin Sawant  Clinic Number: [id number]    Therapy Diagnosis:   Encounter Diagnoses   Name Primary?    Pain aggravated by activities of daily living Yes    Decreased strength of lower extremity      Physician: Wilfred Wade MD     Physician Orders: PT Eval and Treat   Medical Diagnosis from Referral: Bone bruise [T14.8XXA], Contusion of right foot, subsequent encounter [S90.31XD]   Evaluation Date: 9/16/2024  Authorization Period Expiration: 9/12/25  Plan of Care Expiration: 12/16/24  Progress Note Due: 12/12/24  Visit # / Visits authorized: 1/ 20     Time In: 9:32  Time Out: 10:35  Total Billable Time: 52 minutes Billing reflects 1:1 direct supervision    MD follow-up:    Precautions: Standard    FOTO:  Goal: 87%  -Intake: 63%  -Status: incomplete  -Discharge: incomplete    Subjective     Pt reports: 9/25- Feeling good, he has able to run a mile and a half the other day.  He was compliant with home exercise program.  Response to previous treatment: Improving symptoms  Functional change: Gradually improving function    Pain: Minimal/10  Location: right foot     Objective      Objective Measures updated at progress report unless specified otherwise.    Problem List:  Impaired ankle/foot mobility  Impaired calf strength  Impaired tolerance to functional loads    Treatment     Kevin received the treatments listed below:      CPT Code Intervention Date/Notes  9/25   MT Manual Therapy Ankle distraction, ankle AP mobs  Ankle PA mobs   TA Treadmill Jog .5 mile    TA Agility Agility ladder  Lateral shuffle  Carioca  A skips   NR BOSU balance flat side up 3 x 30s hold  Y balance 3 x 3 rounds   NR Lateral band walk on forefoot GTB 3 x 10 yds   TE SL heel raise 15# kb 3 x 15   TE Easy bar squat 105# 3 x 8     08 minutes of Manual therapy (MT) to improve pain and ROM. (95462)  10 minutes of Therapeutic Exercise (TE) to develop strength,  endurance, ROM, and flexibility. (42680)  10 minutes of Neuromuscular Re-Education (NMR)  to improve: Balance, Coordination, Kinesthetic, Sense, Proprioception, and Posture. (91723)  24 minutes of Therapeutic Activities (TA) to improve functional performance. (80489)  Unattended Electrical Stimulation (ES) for muscle performance and/or pain modulation. (75067)  Vasopneumatic Device Therapy () for management of swelling/edema. (80913)  BFR: Blood flow restriction applied during exercise  NP: Not Performed        Patient Education and Home Exercises         Education provided:   Physical therapy diagnosis, prognosis, and plan of care.     Written Home Exercises Provided: Patient instructed to cont prior HEP.  Exercises were reviewed and Kevin was able to demonstrate them prior to the end of the session.  Kevin demonstrated good  understanding of the education provided.     See EMR under Patient Instructions for exercises provided prior visit.    Assessment     9/25- the patient is having much less foot sensitivity overall. Hes able to run overground without significant symptoms. He did well with introduction of agility exercises and higher strength exercises today    Kevin Is progressing well towards his goals.   Pt prognosis is Excellent.     Pt will continue to benefit from skilled outpatient physical therapy to address the deficits listed in the problem list box on initial evaluation, provide pt/family education and to maximize pt's level of independence in the home and community environment.     Pt's spiritual, cultural and educational needs considered and pt agreeable to plan of care and goals.    Anticipated barriers to physical therapy: None     Goals:     Short Term Goals:  6 weeks Status  Date Met   PAIN: Pt will report worst pain of 2/10 in order to progress toward max functional ability and improve quality of life. [x] Progressing  [] Met  [] Not Met     FUNCTION: Patient will demonstrate improved function  as indicated by a functional score improvement of at least 5 points on FOTO. [x] Progressing  [] Met  [] Not Met     MOBILITY: Patient will improve AROM to 50% of stated goals, listed in objective measures above, in order to progress towards independence with functional activities.  [x] Progressing  [] Met  [] Not Met     STRENGTH: Patient will improve strength to 50% of stated goals, listed in objective measures above, in order to progress towards independence with functional activities. [x] Progressing  [] Met  [] Not Met     POSTURE: Patient will correct postural deviations in sitting and standing, to decrease pain and promote long term stability.  [x] Progressing  [] Met  [] Not Met     GAIT: Patient will demonstrate improved gait mechanics including symmetrical gait pattern in order to improve functional mobility, improve quality of life, and decrease risk of further injury or fall.  [x] Progressing  [] Met  [] Not Met     HEP: Patient will demonstrate independence with HEP in order to progress toward functional independence. [x] Progressing  [] Met  [] Not Met        Long Term Goals:  12 weeks Status Date Met   PAIN: Pt will report worst pain of 0/10 in order to progress toward max functional ability and improve quality of life [x] Progressing  [] Met  [] Not Met     FUNCTION: Patient will demonstrate improved function as indicated by a FOTO functional score improvement as listed in header. [x] Progressing  [] Met  [] Not Met     MOBILITY: Patient will improve AROM to stated goals, listed in objective measures above, in order to return to maximal functional potential and improve quality of life.  [x] Progressing  [] Met  [] Not Met     STRENGTH: Patient will improve strength to stated goals, listed in objective measures above, in order to improve functional independence and quality of life.  [x] Progressing  [] Met  [] Not Met     GAIT: Patient will demonstrate normalized gait mechanics with minimal  compensation in order to return to PLOF. [x] Progressing  [] Met  [] Not Met     Patient will return to normal ADL's, IADL's, community involvement, recreational activities, and work-related activities with less than or equal to 0/10 pain and maximal function.  [x] Progressing  [] Met  [] Not Met          Plan     Plan of care Certification: 9/16/2024 to 12/16/24.      Continue to progress per protocol and per patient tolerance      Manuel Morgan, PT

## 2024-09-30 ENCOUNTER — CLINICAL SUPPORT (OUTPATIENT)
Facility: HOSPITAL | Age: 19
End: 2024-09-30
Payer: COMMERCIAL

## 2024-09-30 DIAGNOSIS — R29.898 DECREASED STRENGTH OF LOWER EXTREMITY: ICD-10-CM

## 2024-09-30 DIAGNOSIS — R52 PAIN AGGRAVATED BY ACTIVITIES OF DAILY LIVING: Primary | ICD-10-CM

## 2024-09-30 PROCEDURE — 97110 THERAPEUTIC EXERCISES: CPT | Mod: PN | Performed by: PHYSICAL THERAPIST

## 2024-09-30 PROCEDURE — 97112 NEUROMUSCULAR REEDUCATION: CPT | Mod: PN | Performed by: PHYSICAL THERAPIST

## 2024-09-30 PROCEDURE — 97140 MANUAL THERAPY 1/> REGIONS: CPT | Mod: PN | Performed by: PHYSICAL THERAPIST

## 2024-09-30 NOTE — PROGRESS NOTES
[facility]   Physical Therapy Treatment Note      Name: Kevin Sawant  Clinic Number: [id number]    Therapy Diagnosis:   Encounter Diagnoses   Name Primary?    Pain aggravated by activities of daily living Yes    Decreased strength of lower extremity      Physician: Wilfred Wade MD     Physician Orders: PT Eval and Treat   Medical Diagnosis from Referral: Bone bruise [T14.8XXA], Contusion of right foot, subsequent encounter [S90.31XD]   Evaluation Date: 9/16/2024  Authorization Period Expiration: 9/12/25  Plan of Care Expiration: 12/16/24  Progress Note Due: 12/12/24  Visit # / Visits authorized: 3/20 (+1 for evaluation)     Precautions: Standard    Time In: 9:40 am  Time Out: 10:36 am  Total Billable Time: 56 minutes    MD follow-up:    Precautions: Standard    Subjective     Pt reports: No pain today but continued pain with prolonged activity.   He was compliant with home exercise program.  Response to previous treatment: Improving symptoms  Functional change: Gradually improving function    Pain: Minimal/10  Location: right foot     Objective      Objective Measures updated at progress report unless specified otherwise.    Problem List:  Impaired ankle/foot mobility  Impaired calf strength  Impaired tolerance to functional loads    Treatment     Kevin received the treatments listed below:      CPT Code Intervention Date/Notes  9/30   MT Manual Therapy Midfoot mobilizations  Metartarsal mobilizations    TE Treadmill Jog .5 mile   NR Agility Agility ladder  Lateral shuffle  Carioca  A skips   NR BOSU balance flat side up 3 x 30s hold   NR Lateral band walk on forefoot RTB 3 x 10 yds   TE SL heel raise 20# kb 4 x 15   TE Safety bar squat 105# 3 x 8   NR SLS around the [place]     TE Goblet squat     TE Seated heel raise on knee extension machine     TA Alter G     NR Lateral step down                                                10 minutes of Manual therapy (MT) to improve pain and ROM.  (78370)  20 minutes of Therapeutic Exercise (TE) to develop strength, endurance, ROM, and flexibility. (45620)  25 minutes of Neuromuscular Re-Education (NR)  to improve: Balance, Coordination, Kinesthetic, Sense, Proprioception, and Posture. (46326)        Patient Education and Home Exercises         Education provided:   Physical therapy diagnosis, prognosis, and plan of care.     Written Home Exercises Provided: Patient instructed to cont prior HEP.  Exercises were reviewed and Kevin was able to demonstrate them prior to the end of the session.  Kevin demonstrated good  understanding of the education provided.     See EMR under Patient Instructions for exercises provided prior visit.    Assessment   Performed MT to improve forefoot and midfoot mobility. Patient tolerated today's session well without complaint of pain.     Kevin Is progressing well towards his goals.   Pt prognosis is Excellent.     Pt will continue to benefit from skilled outpatient physical therapy to address the deficits listed in the problem list box on initial evaluation, provide pt/family education and to maximize pt's level of independence in the home and community environment.     Pt's spiritual, cultural and educational needs considered and pt agreeable to plan of care and goals.    Anticipated barriers to physical therapy: None     Goals:     Short Term Goals:  6 weeks Status  Date Met   PAIN: Pt will report worst pain of 2/10 in order to progress toward max functional ability and improve quality of life. [x] Progressing  [] Met  [] Not Met     FUNCTION: Patient will demonstrate improved function as indicated by a functional score improvement of at least 5 points on FOTO. [x] Progressing  [] Met  [] Not Met     MOBILITY: Patient will improve AROM to 50% of stated goals, listed in objective measures above, in order to progress towards independence with functional activities.  [x] Progressing  [] Met  [] Not Met     STRENGTH: Patient will  improve strength to 50% of stated goals, listed in objective measures above, in order to progress towards independence with functional activities. [x] Progressing  [] Met  [] Not Met     POSTURE: Patient will correct postural deviations in sitting and standing, to decrease pain and promote long term stability.  [x] Progressing  [] Met  [] Not Met     GAIT: Patient will demonstrate improved gait mechanics including symmetrical gait pattern in order to improve functional mobility, improve quality of life, and decrease risk of further injury or fall.  [x] Progressing  [] Met  [] Not Met     HEP: Patient will demonstrate independence with HEP in order to progress toward functional independence. [x] Progressing  [] Met  [] Not Met        Long Term Goals:  12 weeks Status Date Met   PAIN: Pt will report worst pain of 0/10 in order to progress toward max functional ability and improve quality of life [x] Progressing  [] Met  [] Not Met     FUNCTION: Patient will demonstrate improved function as indicated by a FOTO functional score improvement as listed in header. [x] Progressing  [] Met  [] Not Met     MOBILITY: Patient will improve AROM to stated goals, listed in objective measures above, in order to return to maximal functional potential and improve quality of life.  [x] Progressing  [] Met  [] Not Met     STRENGTH: Patient will improve strength to stated goals, listed in objective measures above, in order to improve functional independence and quality of life.  [x] Progressing  [] Met  [] Not Met     GAIT: Patient will demonstrate normalized gait mechanics with minimal compensation in order to return to PLOF. [x] Progressing  [] Met  [] Not Met     Patient will return to normal ADL's, IADL's, community involvement, recreational activities, and work-related activities with less than or equal to 0/10 pain and maximal function.  [x] Progressing  [] Met  [] Not Met          Plan   Continue to progress per protocol and per  patient tolerance      Frederick Simon, PT, DPT, SCS

## 2024-10-02 ENCOUNTER — CLINICAL SUPPORT (OUTPATIENT)
Facility: HOSPITAL | Age: 19
End: 2024-10-02
Payer: COMMERCIAL

## 2024-10-02 DIAGNOSIS — R29.898 DECREASED STRENGTH OF LOWER EXTREMITY: ICD-10-CM

## 2024-10-02 DIAGNOSIS — R52 PAIN AGGRAVATED BY ACTIVITIES OF DAILY LIVING: Primary | ICD-10-CM

## 2024-10-02 PROCEDURE — 97140 MANUAL THERAPY 1/> REGIONS: CPT | Mod: PN | Performed by: PHYSICAL THERAPIST

## 2024-10-02 PROCEDURE — 97110 THERAPEUTIC EXERCISES: CPT | Mod: PN | Performed by: PHYSICAL THERAPIST

## 2024-10-02 PROCEDURE — 97112 NEUROMUSCULAR REEDUCATION: CPT | Mod: PN | Performed by: PHYSICAL THERAPIST

## 2024-10-02 NOTE — PROGRESS NOTES
[facility]   Physical Therapy Treatment Note      Name: Kevin Sawant  Clinic Number: [id number]    Therapy Diagnosis:   Encounter Diagnoses   Name Primary?    Pain aggravated by activities of daily living Yes    Decreased strength of lower extremity      Physician: Wilfred Wade MD     Physician Orders: PT Eval and Treat   Medical Diagnosis from Referral: Bone bruise [T14.8XXA], Contusion of right foot, subsequent encounter [S90.31XD]   Evaluation Date: 9/16/2024  Authorization Period Expiration: 9/12/25  Plan of Care Expiration: 12/16/24  Progress Note Due: 12/12/24  Visit # / Visits authorized: 4/20 (+1 for evaluation)     Precautions: Standard    Time In: 9:50 AM  Time Out: 10:45 AM  Total Billable Time: 55 minutes    MD follow-up:    Precautions: Standard    Subjective     Pt reports: he is doing well today and is not noticing any foot pain. He has no new complaints.  He was compliant with home exercise program.  Response to previous treatment: Improving symptoms  Functional change: Gradually improving function    Pain: Minimal/10  Location: right foot     Objective      Objective Measures updated at progress report unless specified otherwise.    Problem List:  Impaired ankle/foot mobility  Impaired calf strength  Impaired tolerance to functional loads    Treatment     Kevin received the treatments listed below:      CPT Code Intervention Date/Notes  10/2/24   MT Manual Therapy Ankle distraction, ankle AP mobs  Ankle PA mobs   TE Treadmill Jog .5 mile   NR Agility Agility ladder  Lateral shuffle  Carioca  A skips   NR BOSU balance flat side up 3 x 30s hold   NR Lateral band walk on forefoot RTB 3 x 10 yds   TE SL heel raise 20# kb 4 x 15   TE Safety bar squat Bar 1 x 8   105# 1 x 8  125# 2 x 8      10 minutes of Manual therapy (MT) to improve pain and ROM. (52195)  20 minutes of Therapeutic Exercise (TE) to develop strength, endurance, ROM, and flexibility. (12380)  25 minutes of  Neuromuscular Re-Education (NR)  to improve: Balance, Coordination, Kinesthetic, Sense, Proprioception, and Posture. (26595)         Patient Education and Home Exercises         Education provided:   Physical therapy diagnosis, prognosis, and plan of care.     Written Home Exercises Provided: Patient instructed to cont prior HEP.  Exercises were reviewed and Kevin was able to demonstrate them prior to the end of the session.  Kevin demonstrated good  understanding of the education provided.     See EMR under Patient Instructions for exercises provided prior visit.    Assessment   Patient tolerated today's treatment well. He continues to improve. He will benefit from continued physical therapy care.    Kevin Is progressing well towards his goals.   Pt prognosis is Excellent.     Pt will continue to benefit from skilled outpatient physical therapy to address the deficits listed in the problem list box on initial evaluation, provide pt/family education and to maximize pt's level of independence in the home and community environment.     Pt's spiritual, cultural and educational needs considered and pt agreeable to plan of care and goals.    Anticipated barriers to physical therapy: None     Goals:     Short Term Goals:  6 weeks Status  Date Met   PAIN: Pt will report worst pain of 2/10 in order to progress toward max functional ability and improve quality of life. [x] Progressing  [] Met  [] Not Met     FUNCTION: Patient will demonstrate improved function as indicated by a functional score improvement of at least 5 points on FOTO. [x] Progressing  [] Met  [] Not Met     MOBILITY: Patient will improve AROM to 50% of stated goals, listed in objective measures above, in order to progress towards independence with functional activities.  [x] Progressing  [] Met  [] Not Met     STRENGTH: Patient will improve strength to 50% of stated goals, listed in objective measures above, in order to progress towards independence with  functional activities. [x] Progressing  [] Met  [] Not Met     POSTURE: Patient will correct postural deviations in sitting and standing, to decrease pain and promote long term stability.  [x] Progressing  [] Met  [] Not Met     GAIT: Patient will demonstrate improved gait mechanics including symmetrical gait pattern in order to improve functional mobility, improve quality of life, and decrease risk of further injury or fall.  [x] Progressing  [] Met  [] Not Met     HEP: Patient will demonstrate independence with HEP in order to progress toward functional independence. [x] Progressing  [] Met  [] Not Met        Long Term Goals:  12 weeks Status Date Met   PAIN: Pt will report worst pain of 0/10 in order to progress toward max functional ability and improve quality of life [x] Progressing  [] Met  [] Not Met     FUNCTION: Patient will demonstrate improved function as indicated by a FOTO functional score improvement as listed in header. [x] Progressing  [] Met  [] Not Met     MOBILITY: Patient will improve AROM to stated goals, listed in objective measures above, in order to return to maximal functional potential and improve quality of life.  [x] Progressing  [] Met  [] Not Met     STRENGTH: Patient will improve strength to stated goals, listed in objective measures above, in order to improve functional independence and quality of life.  [x] Progressing  [] Met  [] Not Met     GAIT: Patient will demonstrate normalized gait mechanics with minimal compensation in order to return to PLOF. [x] Progressing  [] Met  [] Not Met     Patient will return to normal ADL's, IADL's, community involvement, recreational activities, and work-related activities with less than or equal to 0/10 pain and maximal function.  [x] Progressing  [] Met  [] Not Met          Plan   Continue to progress per protocol and per patient tolerance      Alec Paul, PT, DPT, SCS

## 2024-11-05 ENCOUNTER — OFFICE VISIT (OUTPATIENT)
Dept: URGENT CARE | Facility: CLINIC | Age: 19
End: 2024-11-05
Payer: COMMERCIAL

## 2024-11-05 VITALS
OXYGEN SATURATION: 99 % | HEIGHT: 68 IN | WEIGHT: 132.63 LBS | DIASTOLIC BLOOD PRESSURE: 61 MMHG | TEMPERATURE: 98 F | HEART RATE: 83 BPM | RESPIRATION RATE: 18 BRPM | SYSTOLIC BLOOD PRESSURE: 110 MMHG | BODY MASS INDEX: 20.1 KG/M2

## 2024-11-05 DIAGNOSIS — R50.9 FEVER, UNSPECIFIED FEVER CAUSE: ICD-10-CM

## 2024-11-05 DIAGNOSIS — R51.9 NONINTRACTABLE HEADACHE, UNSPECIFIED CHRONICITY PATTERN, UNSPECIFIED HEADACHE TYPE: Primary | ICD-10-CM

## 2024-11-05 DIAGNOSIS — Z20.828 EXPOSURE TO THE FLU: ICD-10-CM

## 2024-11-05 DIAGNOSIS — F17.200 SMOKER: ICD-10-CM

## 2024-11-05 DIAGNOSIS — R52 BODY ACHES: ICD-10-CM

## 2024-11-05 DIAGNOSIS — J06.9 UPPER RESPIRATORY TRACT INFECTION, UNSPECIFIED TYPE: ICD-10-CM

## 2024-11-05 LAB
CTP QC/QA: YES
POC MOLECULAR INFLUENZA A AGN: NEGATIVE
POC MOLECULAR INFLUENZA B AGN: NEGATIVE

## 2024-11-05 RX ORDER — OSELTAMIVIR PHOSPHATE 75 MG/1
75 CAPSULE ORAL 2 TIMES DAILY
Qty: 10 CAPSULE | Refills: 0 | Status: SHIPPED | OUTPATIENT
Start: 2024-11-05 | End: 2024-11-10

## 2024-11-05 RX ORDER — BENZONATATE 200 MG/1
200 CAPSULE ORAL 3 TIMES DAILY PRN
Qty: 25 CAPSULE | Refills: 0 | Status: SHIPPED | OUTPATIENT
Start: 2024-11-05 | End: 2024-11-15

## 2024-11-05 NOTE — PATIENT INSTRUCTIONS
Seek immediate medical care if you develop fever, chest pain, or shortness of breath.     You were prescribed Tamiflu which has been shown to reduce duration and severity of the flu. Take medication as prescribed.     You will need to stay home for a few days and a work/school note was provided    Try these tips to keep yourself comfortable:                   -Get plenty of rest.                   -Drink plenty of fluids, at least 8 large glasses of fluid a day. Good fluid choices are water, fruit juices high in Vitamin C, tea, gelatin, or broths and soups. These help to keep mucus thin and ease congestion.                  -Use salt water gargle, cough drops or throat sprays to relieve throat pain. Mi ¼ to ½ teaspoon of salt in 1 cup of warm water for a salt water gargle  solution.                  -Use petroleum jelly or lip balm around lips and nose to prevent chapping.                  -Use saline nose drops or spray to help ease congestion.    Over the Counter (OTC) Medicines:  Take over the counter medicines as needed to ease your signs.  Read labels carefully.  Use a product that treats only the signs that you have. Ask your pharmacist  for recommendations. Be sure to ask about possible interactions with other  medicines you are taking.  Common medicines used to treat signs of the flu include:     -Flonase daily.  -Claritin or Zyrtec daily.  -Mucinex every 12 hours -- Drink plenty of water while taking this medication.    - Cough suppressant, also called antitussive, such as dextromethorphan.  This medicine decreases your reflex and sensitivity to cough. This  medicine may be kept behind the pharmacy counter for purchase.    Cold and cough medicines often contain more than one type of medicine.  Ask the pharmacist for help to confirm that you are not using more than one  product with the same or similar ingredient. For example, some cold and  cough medicines have acetaminophen or ibuprofen in them to help  lower a  fever or ease muscle aches. Do not take extra acetaminophen (Tylenol) or  ibuprofen (Advil, Motrin) if the cold or cough medicine has it as an  ingredient. Too much medicine could be harmful.    Take the correct dose as listed on the package. Do not take more than  recommended.    Use a Humidifier:  A cool mist humidifier can make breathing easier by thinning mucus. Do not use  a steam humidifier as hot water can cause burns if spilled.  Place the humidifier a few feet from the bed. Drain and clean each day with  soap and water to prevent bacteria and mold from growing.  Indoor humidity should not be above 50%. Stop using the humidifier if you  notice moisture on windows, walls or pictures.  You do not need to add any medicine to the humidifier.  If you cannot get a humidifier, place a pan of water next to heating vents and  refill the water level daily. The water will evaporate and add moisture to the  Room.    How to prevent the spread of viral illness including flu:  -Wash your hands with soap and water or use alcohol based hand   often. Dry hands wet from washing with soap on a paper towel instead of cloth towel.  -Cough or sneeze into your elbow to avoid spreading germs.  -Wipe down common surfaces, such as door knobs and faucet handles, with a disinfectant spray.  -Do not share cups or utensils.        Please follow up with your Primary care provider within 2-5 days if your signs and symptoms have not resolved or worsen.      If your condition worsens or fails to improve we recommend that you receive another evaluation at the emergency room immediately or contact your primary medical clinic to discuss your concerns.   You must understand that you have received an Urgent Care treatment only and that you may be released before all of your medical problems are known or treated. You, the patient, will arrange for follow up care as instructed.     Tobacco Cessation  Smoking, vaping, and smokeless  tobacco cause harm by raising blood pressure and increasing your risk of heart attack and stroke. Chewing tobacco increases your risk of cancer of the face and throat. Smoking not only raises the risk of cancer, but more often, causes emphysema. This crippling lung disease can cause constant shortness of breath and a need to use oxygen. Stopping smoking can make the difference between playing with your grandchildren outside and sitting by with your oxygen tank watching them.     You may already know your nicotine habit is dangerous, but perhaps you feel helpless or dont know where to start.    Here at [facility] we are invested in the improvement of your health. We would be happy to help you with smoking cessation. If you are interested in quitting and answer yes to   the questions below, we can provide you with FREE assistance to get you on your way.     ? Are you a [place] resident?  ? Did you start smoking before September 1, 1988?  ? Are you ready to quit smoking?    Quitting doesnt have to be lonely -   [facility]s Smoking Cessation program offers a supportive environment along with      FREE smoking cessation counseling to help get you through those rough patches   FREE or reduced medications* to help curb the cravings    You do not need to be an [facility] patient to participate in the program.  Ready? Call [phone] or toll free [phone] or visit [email or web address] to sign up for the program.

## 2024-11-05 NOTE — PROGRESS NOTES
"Subjective:      Patient ID: Kevin Sawant is a 19 y.o. male.    Vitals:  height is 5' 8" (1.727 m) and weight is 60.2 kg (132 lb 9.7 oz). His tympanic temperature is 98 °F (36.7 °C). His blood pressure is 110/61 and his pulse is 83. His respiration is 18 and oxygen saturation is 99%.     Chief Complaint: Headache    Patient is a 19-year-old male who presents for evaluation of headaches, body aches and sore throat.  Onset yesterday.  Associated symptoms include subjective fever.  Treating with over-the-counter meds.  Reports exposure to the flu.  Denies dyspnea, wheezing, vomiting, diarrhea or rash.  No recent travel reported.  No other concerns voiced    Headache   This is a new problem. The current episode started today. The problem occurs constantly. The problem has been unchanged. The pain is at a severity of 8/10. The pain is mild. Associated symptoms include a fever, muscle aches and a sore throat. Pertinent negatives include no abdominal pain, abnormal behavior, anorexia, back pain, blurred vision, coughing, dizziness, drainage, ear pain, eye pain, eye redness, eye watering, facial sweating, hearing loss, insomnia, loss of balance, nausea, neck pain, numbness, phonophobia, photophobia, rhinorrhea, scalp tenderness, seizures, swollen glands, tingling, tinnitus, visual change, vomiting, weakness or weight loss. Nothing aggravates the symptoms. He has tried NSAIDs for the symptoms. The treatment provided mild relief. There is no history of cancer, cluster headaches, hypertension, immunosuppression, migraine headaches, migraines in the family, obesity, pseudotumor cerebri, recent head traumas, sinus disease or TMJ.       Constitution: Positive for chills and fever.   HENT:  Positive for congestion and sore throat. Negative for ear pain, tinnitus and hearing loss.    Neck: Negative for neck pain.   Eyes:  Negative for eye pain, eye redness, photophobia and blurred vision.   Respiratory:  Negative for cough, shortness " of breath, stridor and wheezing.    Gastrointestinal:  Negative for abdominal pain, nausea, vomiting and diarrhea.   Musculoskeletal:  Negative for back pain.   Skin:  Negative for rash.   Neurological:  Positive for headaches. Negative for dizziness, loss of balance, history of migraines, numbness and seizures.   Psychiatric/Behavioral:  The patient does not have insomnia.       Objective:     Physical Exam   Constitutional: He is oriented to person, place, and time. He appears well-developed. He is cooperative.  Non-toxic appearance. He does not appear ill. No distress.   HENT:   Head: Normocephalic and atraumatic.   Ears:   Right Ear: Hearing and external ear normal.   Left Ear: Hearing and external ear normal.   Nose: Nose normal. No mucosal edema, rhinorrhea, nasal deformity or congestion. No epistaxis. Right sinus exhibits no maxillary sinus tenderness and no frontal sinus tenderness. Left sinus exhibits no maxillary sinus tenderness and no frontal sinus tenderness.   Mouth/Throat: Uvula is midline, oropharynx is clear and moist and mucous membranes are normal. No trismus in the jaw. Normal dentition. No uvula swelling. No oropharyngeal exudate, posterior oropharyngeal edema or posterior oropharyngeal erythema.   Eyes: Conjunctivae and lids are normal. No scleral icterus.   Neck: Trachea normal and phonation normal. Neck supple. No edema present. No erythema present. No neck rigidity present.   Cardiovascular: Normal rate, regular rhythm, normal heart sounds and normal pulses.   Pulmonary/Chest: Effort normal and breath sounds normal. No respiratory distress. He has no decreased breath sounds. He has no wheezes. He has no rhonchi. He has no rales.   Abdominal: Normal appearance.   Musculoskeletal: Normal range of motion.         General: No deformity. Normal range of motion.   Neurological: He is alert and oriented to person, place, and time. He exhibits normal muscle tone. Coordination normal.   Skin: Skin is  warm, dry, intact, not diaphoretic and not pale.   Psychiatric: His speech is normal and behavior is normal. Judgment and thought content normal.   Nursing note and vitals reviewed.      Assessment:     1. Nonintractable headache, unspecified chronicity pattern, unspecified headache type    2. Fever, unspecified fever cause    3. Body aches    4. Upper respiratory tract infection, unspecified type    5. Exposure to the flu    6. Smoker        Plan:       Nonintractable headache, unspecified chronicity pattern, unspecified headache type  -     POCT Influenza A/B MOLECULAR    Fever, unspecified fever cause    Body aches    Upper respiratory tract infection, unspecified type    Exposure to the flu    Smoker    Other orders  -     oseltamivir (TAMIFLU) 75 MG capsule; Take 1 capsule (75 mg total) by mouth 2 (two) times daily. for 5 days  Dispense: 10 capsule; Refill: 0  -     benzonatate (TESSALON) 200 MG capsule; Take 1 capsule (200 mg total) by mouth 3 (three) times daily as needed for Cough.  Dispense: 25 capsule; Refill: 0          Medical Decision Making:   Initial Assessment:   Nontoxic appearing 18 yo male c/o fever and congestion.  After complete evaluation, including thorough history and physical exam, the patient's symptoms are most likely due to viral upper respiratory infection. There are no concerning features on physical exam to suggest bacterial otitis media/externa, sinusitis, strep pharyngitis, or peritonsillar abscess. Vital signs do not suggest sepsis. Lung sounds are clear and not consistent with pneumonia. There is no neck pain or limited ROM to suggest retropharyngeal abscess or meningitis. In clinic testing for flu is negative.The patient will be treated with supportive care. Will provide RX for tamiflu and tessalon upon D/C. Follow up instructions and ED precautions provided.          Results for orders placed or performed in visit on 11/05/24   POCT Influenza A/B MOLECULAR    Collection Time:  11/05/24  4:31 PM   Result Value Ref Range    POC Molecular Influenza A Ag Negative Negative    POC Molecular Influenza B Ag Negative Negative     Acceptable Yes      Patient Instructions       Seek immediate medical care if you develop fever, chest pain, or shortness of breath.     You were prescribed Tamiflu which has been shown to reduce duration and severity of the flu. Take medication as prescribed.     You will need to stay home for a few days and a work/school note was provided    Try these tips to keep yourself comfortable:                   -Get plenty of rest.                   -Drink plenty of fluids, at least 8 large glasses of fluid a day. Good fluid choices are water, fruit juices high in Vitamin C, tea, gelatin, or broths and soups. These help to keep mucus thin and ease congestion.                  -Use salt water gargle, cough drops or throat sprays to relieve throat pain. Mi ¼ to ½ teaspoon of salt in 1 cup of warm water for a salt water gargle  solution.                  -Use petroleum jelly or lip balm around lips and nose to prevent chapping.                  -Use saline nose drops or spray to help ease congestion.    Over the Counter (OTC) Medicines:  Take over the counter medicines as needed to ease your signs.  Read labels carefully.  Use a product that treats only the signs that you have. Ask your pharmacist  for recommendations. Be sure to ask about possible interactions with other  medicines you are taking.  Common medicines used to treat signs of the flu include:     -Flonase daily.  -Claritin or Zyrtec daily.  -Mucinex every 12 hours -- Drink plenty of water while taking this medication.    - Cough suppressant, also called antitussive, such as dextromethorphan.  This medicine decreases your reflex and sensitivity to cough. This  medicine may be kept behind the pharmacy counter for purchase.    Cold and cough medicines often contain more than one type of medicine.  Ask the  pharmacist for help to confirm that you are not using more than one  product with the same or similar ingredient. For example, some cold and  cough medicines have acetaminophen or ibuprofen in them to help lower a  fever or ease muscle aches. Do not take extra acetaminophen (Tylenol) or  ibuprofen (Advil, Motrin) if the cold or cough medicine has it as an  ingredient. Too much medicine could be harmful.    Take the correct dose as listed on the package. Do not take more than  recommended.    Use a Humidifier:  A cool mist humidifier can make breathing easier by thinning mucus. Do not use  a steam humidifier as hot water can cause burns if spilled.  Place the humidifier a few feet from the bed. Drain and clean each day with  soap and water to prevent bacteria and mold from growing.  Indoor humidity should not be above 50%. Stop using the humidifier if you  notice moisture on windows, walls or pictures.  You do not need to add any medicine to the humidifier.  If you cannot get a humidifier, place a pan of water next to heating vents and  refill the water level daily. The water will evaporate and add moisture to the  Room.    How to prevent the spread of viral illness including flu:  -Wash your hands with soap and water or use alcohol based hand   often. Dry hands wet from washing with soap on a paper towel instead of cloth towel.  -Cough or sneeze into your elbow to avoid spreading germs.  -Wipe down common surfaces, such as door knobs and faucet handles, with a disinfectant spray.  -Do not share cups or utensils.        Please follow up with your Primary care provider within 2-5 days if your signs and symptoms have not resolved or worsen.      If your condition worsens or fails to improve we recommend that you receive another evaluation at the emergency room immediately or contact your primary medical clinic to discuss your concerns.   You must understand that you have received an Urgent Care treatment only  and that you may be released before all of your medical problems are known or treated. You, the patient, will arrange for follow up care as instructed.     Tobacco Cessation  Smoking, vaping, and smokeless tobacco cause harm by raising blood pressure and increasing your risk of heart attack and stroke. Chewing tobacco increases your risk of cancer of the face and throat. Smoking not only raises the risk of cancer, but more often, causes emphysema. This crippling lung disease can cause constant shortness of breath and a need to use oxygen. Stopping smoking can make the difference between playing with your grandchildren outside and sitting by with your oxygen tank watching them.     You may already know your nicotine habit is dangerous, but perhaps you feel helpless or dont know where to start.    Here at [facility] we are invested in the improvement of your health. We would be happy to help you with smoking cessation. If you are interested in quitting and answer yes to   the questions below, we can provide you with FREE assistance to get you on your way.     ? Are you a [place] resident?  ? Did you start smoking before September 1, 1988?  ? Are you ready to quit smoking?    Quitting doesnt have to be lonely -   [facility]s Smoking Cessation program offers a supportive environment along with      FREE smoking cessation counseling to help get you through those rough patches   FREE or reduced medications* to help curb the cravings    You do not need to be an [facility] patient to participate in the program.  Ready? Call [phone] or toll free [phone] or visit [email or web address] to sign up for the program.

## 2024-11-05 NOTE — LETTER
November 5, 2024      [facility] [address] [place] [address]  Phone: [phone]  Fax: [phone]       Patient: Kevin Sawant   YOB: 2005  Date of Visit: 11/05/2024    To Whom It May Concern:    Nancy Sawant  was at [facility] on 11/05/2024. The patient may return to work/school on 11/07/24 with no restrictions. If you have any questions or concerns, or if I can be of further assistance, please do not hesitate to contact me.    Sincerely,      Nessa Jimenez, NP

## 2024-11-30 ENCOUNTER — HOSPITAL ENCOUNTER (EMERGENCY)
Facility: HOSPITAL | Age: 19
Discharge: HOME | End: 2024-11-30
Attending: EMERGENCY MEDICINE
Payer: COMMERCIAL

## 2024-11-30 VITALS
DIASTOLIC BLOOD PRESSURE: 68 MMHG | HEIGHT: 68 IN | HEART RATE: 91 BPM | OXYGEN SATURATION: 99 % | WEIGHT: 130 LBS | SYSTOLIC BLOOD PRESSURE: 112 MMHG | TEMPERATURE: 98 F | RESPIRATION RATE: 18 BRPM | BODY MASS INDEX: 19.7 KG/M2

## 2024-11-30 DIAGNOSIS — S09.90XA INJURY OF HEAD, INITIAL ENCOUNTER: Primary | ICD-10-CM

## 2024-11-30 PROCEDURE — 99284 EMERGENCY DEPT VISIT MOD MDM: CPT | Mod: 25

## 2024-11-30 RX ORDER — ACETAMINOPHEN 500 MG
1000 TABLET ORAL EVERY 6 HOURS PRN
Qty: 20 TABLET | Refills: 0 | Status: SHIPPED | OUTPATIENT
Start: 2024-11-30

## 2024-11-30 RX ORDER — ACETAMINOPHEN 500 MG
1000 TABLET ORAL
Status: DISCONTINUED | OUTPATIENT
Start: 2024-11-30 | End: 2024-11-30 | Stop reason: HOSPADM

## 2024-11-30 RX ORDER — IBUPROFEN 600 MG/1
600 TABLET ORAL EVERY 6 HOURS PRN
Qty: 20 TABLET | Refills: 0 | Status: SHIPPED | OUTPATIENT
Start: 2024-11-30

## 2024-11-30 NOTE — DISCHARGE INSTRUCTIONS
Thank you for coming to our Emergency Department today. It is important to remember that some problems or medical conditions are difficult to diagnose and may not be found during your Emergency Department visit.     Be sure to follow up with your primary care doctor and review all labs/imaging/tests that were performed during your ER visit with them. Some labs/tests may be outside of the normal range and require non-emergent follow-up and further investigation to help diagnose/exclude/prevent complications or other potentially serious medical conditions that were not addressed during your ER visit.    If you do not have a primary care doctor, you may contact the one listed on your discharge paperwork or you may also call the [facility] Appointment Desk at [phone] to schedule an appointment and establish care with one. It is important to your health that you have a primary care doctor.    Please take all medications as directed. All medications may potentially have side-effects and it is impossible to predict which medications may give you side-effects or what side-effects (if any) they will give you.. If you feel that you are having a negative effect or side-effect of any medication you should immediately stop taking them and seek medical attention. If you feel that you are having a life-threatening reaction call 911.    Return to the ER with any questions/concerns, new/concerning symptoms, worsening or failure to improve.     Do not drive, swim, climb to height, take a bath, operate heavy machinery, drink alcohol or take potentially sedating medications, sign any legal documents or make any important decisions for 24 hours if you have received any pain medications, sedatives or mood altering drugs during your ER visit or within 24 hours of taking them if they have been prescribed to you.     You can find additional resources for Dentists, hearing aids, durable medical equipment, low cost pharmacies and  other resources at [email or web address]    BELOW THIS LINE ONLY APPLIES IF YOU HAVE A COVID TEST PENDING OR IF YOU HAVE BEEN DIAGNOSED WITH COVID:  Please access [facility] to review the results of your test. Until the results of your COVID test return, you should isolate yourself so as not to potentially spread illness to others.   If your COVID test returns positive, you should isolate yourself so as not to spread illness to others. After five full days, if you are feeling better and you have not had fever for 24 hours, you can return to your typical daily activities, but you must wear a mask around others for an additional 5 days.   If your COVID test returns negative and you are either unvaccinated or more than six months out from your two-dose vaccine and are not yet boosted, you should still quarantine for 5 full days followed by strict mask use for an additional 5 full days.   If your COVID test returns negative and you have received your 2-dose initial vaccine as well as a booster, you should continue strict mask use for 10 full days after the exposure.  For all those exposed, best practice includes a test at day 5 after the exposure. This can be a home test or a test through one of the many testing centers throughout our community.   Masking is always advised to limit the spread of COVID. Cdc.gov is an excellent site to obtain the latest up to date recommendations regarding COVID and COVID testing.     CDC Testing and Quarantine Guidelines for patients with exposure to a known-positive COVID-19 person:  A close exposure is defined as anyone who has had an exposure (masked or unmasked) to a known COVID -19 positive person within 6 feet of someone for a cumulative total of 15 minutes or more over a 24-hour period.   Vaccinated and/or if you recently had a positive covid test within 90 days do NOT need to quarantine after contact with someone who had COVID-19 unless you develop symptoms.   Fully vaccinated  people who have not had a positive test within 90 days, should get tested 3-5 days after their exposure, even if they don't have symptoms and wear a mask indoors in public for 14 days following exposure or until their test result is negative.      Unvaccinated and/or NOT had a positive test within 90 days and meet close exposure  You are required by CDC guidelines to quarantine for at least 5 days from time of exposure followed by 5 days of strict masking. It is recommended, but not required to test after 5 days, unless you develop symptoms, in which case you should test at that time.  If you get tested after 5 days and your test is positive, your 5 day period of isolation starts the day of the positive test.    If your exposure does not meet the above definition, you can return to your normal daily activities to include social distancing, wearing a mask and frequent handwashing.      Here is a link to guidance from the CDC:  https://www.cdc.gov/media/releases/2021/s1227-isolation-quarantine-guidance.html      [place] Dept Of Health Testing Sites:  https://ldh.[place].gov/page/3934      [facility] website with testing locations and guidance:  [email or web address]

## 2024-11-30 NOTE — ED PROVIDER NOTES
"Encounter Date: 11/30/2024    SCRIBE #1 NOTE: I, Kelly Steffany, am scribing for, and in the presence of,  Jazmin Mc NP.       History     Chief Complaint   Patient presents with    Concussion     Pt to ER with reports was diagnosed with a concussion 1 week ago. Pt reports + dizziness, agitation since concussion. Pt reports " its not getting better".  Pt denies N/V.      CC: concussion     HPI : This is a 20 yo M w/ PMHx of ADHD presenting to the ED for concern of concussion he sustained 1 week ago. He was playing soccer when he was elbowed in the head, and was told by his  he had a concussion. No acute LOC. He reports increased agitation, intermittent room spinning dizziness and photophobia since the event. He reports feeling "overstimulated" and has been more stressed out this week with driving back and forth to [place] and [place], stressed about school and grades. He also reports ceasing marijuana use 3 weeks ago, but . No other exacerbating or alleviating factors. Denies nausea, vomiting, paresthesias, vision changes, neck pain, headaches, or other associated symptoms. No attempted tx. No anticoagulation use. He does note he may have suffered another concussion around 2-3 weeks ago.     The history is provided by the patient.     Review of patient's allergies indicates:   Allergen Reactions    Milk containing products (dairy)      Past Medical History:   Diagnosis Date    ADHD (attention deficit hyperactivity disorder)      No past surgical history on file.  Family History   Problem Relation Name Age of Onset    Colon cancer Neg Hx      Esophageal cancer Neg Hx       Social History     Tobacco Use    Smoking status: Every Day    Smokeless tobacco: Never    Tobacco comments:     ZYN 3mg nicotine pouches    Substance Use Topics    Alcohol use: Yes     Comment: socially    Drug use: Yes     Types: Marijuana     Review of Systems   Constitutional:  Negative for chills and fever.   HENT:  Negative for congestion, " rhinorrhea and sore throat.    Eyes:  Negative for visual disturbance.   Respiratory:  Negative for cough and shortness of breath.    Cardiovascular:  Negative for chest pain.   Gastrointestinal:  Negative for abdominal pain, diarrhea, nausea and vomiting.   Genitourinary:  Negative for dysuria, frequency and hematuria.   Musculoskeletal:  Negative for back pain and neck pain.   Skin:  Negative for rash.   Neurological:  Positive for dizziness. Negative for syncope, weakness, numbness and headaches.   Hematological:  Does not bruise/bleed easily.   Psychiatric/Behavioral:  Positive for agitation.        Physical Exam     Initial Vitals [11/30/24 1118]   BP Pulse Resp Temp SpO2   112/68 91 18 98 °F (36.7 °C) 99 %      MAP       --         Physical Exam    Vitals reviewed.  Constitutional: He appears well-developed and well-nourished. He is not diaphoretic.  Non-toxic appearance. He does not have a sickly appearance. He does not appear ill. No distress.   Pleasant.  Nondistressed.   HENT:   Head: Normocephalic and atraumatic.   Right Ear: External ear normal.   Left Ear: External ear normal.   Nose: Nose normal. Mouth/Throat: Oropharynx is clear and moist. No oropharyngeal exudate.   Head and face atraumatic and normocephalic.   Eyes: Conjunctivae and EOM are normal. Pupils are equal, round, and reactive to light.   PERRLA.  EOMI without limitation or pain.   Neck:   Normal range of motion.  Cardiovascular:  Normal rate, regular rhythm, normal heart sounds and intact distal pulses.           Pulmonary/Chest: No respiratory distress.   Musculoskeletal:         General: Normal range of motion.      Cervical back: Normal and normal range of motion.      Thoracic back: Normal.      Lumbar back: Normal.      Comments: Ambulatory with normal gait.  There is no Midline tenderness of the C-spine.  5/5 strength of the bilateral upper and lower extremities with sensation intact.     Neurological: He is alert and oriented to  person, place, and time. GCS eye subscore is 4. GCS verbal subscore is 5. GCS motor subscore is 6.   Skin: Skin is warm, dry and intact. No rash noted. No erythema.   Psychiatric: He has a normal mood and affect. Thought content normal.         ED Course   Procedures  Labs Reviewed - No data to display       Imaging Results              CT Head Without Contrast (Final result)  Result time 11/30/24 12:14:30      Final result by Jimbo Grayson MD (11/30/24 12:14:30)                   Impression:      No acute intracranial abnormality identified.      Electronically signed by: Jimbo Grayson MD  Date:    11/30/2024  Time:    12:14               Narrative:    EXAMINATION:  CT HEAD WITHOUT CONTRAST    CLINICAL HISTORY:  Head trauma, moderate-severe;Dizziness, persistent/recurrent, cardiac or vascular cause suspected;    TECHNIQUE:  Low dose axial CT images obtained throughout the head without intravenous contrast. Sagittal and coronal reconstructions were performed.    COMPARISON:  None.    FINDINGS:  Intracranial compartment: Brain appears normally formed.    Ventricles and sulci are normal in size for age without evidence of hydrocephalus. No extra-axial blood or fluid collections.    The brain parenchyma appears normal. No parenchymal mass, hemorrhage, edema or major vascular distribution infarct.  No hyperdense vessel sign.    Skull/extracranial contents (limited evaluation): No fracture. Mastoid air cells and paranasal sinuses are essentially clear.  Imaged portions of the orbits are within normal limits.                                       Medications   acetaminophen tablet 1,000 mg (1,000 mg Oral Not Given 11/30/24 1145)     Medical Decision Making  19-year-old male presenting to the ED evaluation following a head injury sustained 1 week ago.  Differentials include concussion syndrome, posttraumatic headache, Epidural/subdural hematoma, skull fracture, others.  On physical exam: he is AAO, has no focal weakness  or neurological deficits. Has full ROM of neck with no nuchal rigidity or meningeal signs.  There is no staggering or ataxic gait, vomiting, double vision, visual loss, slurred speech, numbness of the face or body, weakness, clumsiness, or incoordination. No external signs of head injury or trauma. No tenderness or induration over the temples. he reports NO: history of malignancy, syncope.    Vital Signs are Reassuring. RESULTS:  CT head negative for acute intracranial abnormality.    Suspect concussion.  Will discharge home with instructions to follow up with Neurology.  Return precautions discussed with the patient who verbalized understanding.  He is agreeable to plan of care.    Amount and/or Complexity of Data Reviewed  Radiology: ordered. Decision-making details documented in ED Course.    Risk  OTC drugs.  Prescription drug management.            Scribe Attestation:   Scribe #1: I performed the above scribed service and the documentation accurately describes the services I performed. I attest to the accuracy of the note.        ED Course as of 11/30/24 1432   Sat Nov 30, 2024   1219 CT Head Without Contrast  FINDINGS:  Intracranial compartment: Brain appears normally formed.     Ventricles and sulci are normal in size for age without evidence of hydrocephalus. No extra-axial blood or fluid collections.     The brain parenchyma appears normal. No parenchymal mass, hemorrhage, edema or major vascular distribution infarct.  No hyperdense vessel sign.     Skull/extracranial contents (limited evaluation): No fracture. Mastoid air cells and paranasal sinuses are essentially clear.  Imaged portions of the orbits are within normal limits.     Impression:     No acute intracranial abnormality identified.      [MM]      ED Course User Index  [MM] Jazmin Mc NP I, M Mercer, personally performed the services described in this documentation. All medical record entries made by the scribe were at my  direction and in my presence. I have reviewed the chart and agree that the record reflects my personal performance and is accurate and complete.      DISCLAIMER: This note was prepared with [employer]101 voice recognition transcription software. Garbled syntax, mangled pronouns, and other bizarre constructions may be attributed to that software system.      Clinical Impression:  Final diagnoses:  [S09.90XA] Injury of head, initial encounter (Primary)          ED Disposition Condition    Discharge Stable          ED Prescriptions       Medication Sig Dispense Start Date End Date Auth. Provider    ibuprofen (ADVIL,MOTRIN) 600 MG tablet Take 1 tablet (600 mg total) by mouth every 6 (six) hours as needed for Pain. 20 tablet 11/30/2024 -- Jazmin Mc NP    acetaminophen (TYLENOL) 500 MG tablet Take 2 tablets (1,000 mg total) by mouth every 6 (six) hours as needed for Pain. 20 tablet 11/30/2024 -- Jazmin Mc NP          Follow-up Information       Follow up With Specialties Details Why Contact Info Additional Information    [facility] -  Schedule an appointment as soon as possible for a visit in 1 day For follow-up if you do not have a primary care doctor [address] [place] [address]  [phone]       [facility] - Neurology 7th Floor Neurology Schedule an appointment as soon as possible for a visit  For follow-up [id number]  [facility] [address] [phone] Neuroscience [facility], 7th Floor Please park in [facility] and take Clinic elevator    [facility] - Emergency Dept Emergency Medicine Go to  If symptoms worsen [address] [facility] [place] [id number] [phone]              Jazmin Mc NP  11/30/24 [id number]

## 2024-12-17 ENCOUNTER — OFFICE VISIT (OUTPATIENT)
Dept: NEUROLOGY | Facility: CLINIC | Age: 19
End: 2024-12-17
Payer: COMMERCIAL

## 2024-12-17 VITALS — SYSTOLIC BLOOD PRESSURE: 115 MMHG | HEART RATE: 93 BPM | DIASTOLIC BLOOD PRESSURE: 81 MMHG

## 2024-12-17 DIAGNOSIS — S06.0X0A CONCUSSION WITHOUT LOSS OF CONSCIOUSNESS, INITIAL ENCOUNTER: ICD-10-CM

## 2024-12-17 DIAGNOSIS — S13.4XXA WHIPLASH INJURIES, INITIAL ENCOUNTER: Primary | ICD-10-CM

## 2024-12-17 PROCEDURE — 99204 OFFICE O/P NEW MOD 45 MIN: CPT | Mod: S$GLB,,, | Performed by: STUDENT IN AN ORGANIZED HEALTH CARE EDUCATION/TRAINING PROGRAM

## 2024-12-17 PROCEDURE — 1159F MED LIST DOCD IN RCRD: CPT | Mod: CPTII,S$GLB,, | Performed by: STUDENT IN AN ORGANIZED HEALTH CARE EDUCATION/TRAINING PROGRAM

## 2024-12-17 PROCEDURE — 3079F DIAST BP 80-89 MM HG: CPT | Mod: CPTII,S$GLB,, | Performed by: STUDENT IN AN ORGANIZED HEALTH CARE EDUCATION/TRAINING PROGRAM

## 2024-12-17 PROCEDURE — 3074F SYST BP LT 130 MM HG: CPT | Mod: CPTII,S$GLB,, | Performed by: STUDENT IN AN ORGANIZED HEALTH CARE EDUCATION/TRAINING PROGRAM

## 2024-12-17 PROCEDURE — 1160F RVW MEDS BY RX/DR IN RCRD: CPT | Mod: CPTII,S$GLB,, | Performed by: STUDENT IN AN ORGANIZED HEALTH CARE EDUCATION/TRAINING PROGRAM

## 2024-12-17 PROCEDURE — 99999 PR PBB SHADOW E&M-EST. PATIENT-LVL III: CPT | Mod: PBBFAC,,, | Performed by: STUDENT IN AN ORGANIZED HEALTH CARE EDUCATION/TRAINING PROGRAM
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
## 2024-12-17 NOTE — PATIENT INSTRUCTIONS
Follow return to play guidelines: Light aerobic exercise with walking or stationary bike at slow to medium pace and no resistance training. If no symptoms at 24 hours, may do endurance exercise drills (running and skating drills) for your sports that do not involve contact (do not head soccer ball, hit other players, etc). If no symptoms at 24 hours, may do coordination drills for your sports (passing, throwing, shooting, etc) while wearing protective equipment but no contact drills and may start progressive resistance training (lifting weights, swimming, etc). If no symptoms for 24 hours, may do full contact practice. If no symptoms at 24 hours, may resume full game participation.  If symptoms return during or after following return to play guidelines, decrease activity to the previous step of the return to play guidelines and ice back of neck for 20 minutes. Once symptoms resolve, increase activities more slowly. If symptoms continue to return with increased activity or become persistent, call the clinic at [phone] or contact me thru [facility].  Ensure adequate hydration so that urine is clear in color throughout the entire day including after athletic participation.